# Patient Record
Sex: FEMALE | Race: BLACK OR AFRICAN AMERICAN | NOT HISPANIC OR LATINO | Employment: FULL TIME | URBAN - METROPOLITAN AREA
[De-identification: names, ages, dates, MRNs, and addresses within clinical notes are randomized per-mention and may not be internally consistent; named-entity substitution may affect disease eponyms.]

---

## 2018-06-18 ENCOUNTER — HISTORICAL (OUTPATIENT)
Dept: INTERNAL MEDICINE | Facility: CLINIC | Age: 23
End: 2018-06-18

## 2018-06-18 LAB
HAV IGM SERPL QL IA: NONREACTIVE
HBV CORE IGM SERPL QL IA: NONREACTIVE
HBV SURFACE AG SERPL QL IA: NEGATIVE
HCV AB SERPL QL IA: NONREACTIVE
HIV 1+2 AB+HIV1 P24 AG SERPL QL IA: NONREACTIVE
T PALLIDUM AB SER QL: NONREACTIVE

## 2018-10-18 LAB — POC BETA-HCG (QUAL): NEGATIVE

## 2020-10-07 LAB
BILIRUB SERPL-MCNC: NEGATIVE MG/DL
BLOOD URINE, POC: NORMAL
CLARITY, POC UA: CLEAR
COLOR, POC UA: YELLOW
GLUCOSE UR QL STRIP: NEGATIVE
KETONES UR QL STRIP: NEGATIVE
LEUKOCYTE EST, POC UA: NEGATIVE
NITRITE, POC UA: NEGATIVE
PH, POC UA: 8.5
POC BETA-HCG (QUAL): NEGATIVE
PROTEIN, POC: NEGATIVE
SPECIFIC GRAVITY, POC UA: 1.02
UROBILINOGEN, POC UA: NORMAL

## 2021-01-04 ENCOUNTER — HISTORICAL (OUTPATIENT)
Dept: LAB | Facility: HOSPITAL | Age: 26
End: 2021-01-04

## 2022-01-27 LAB
HIGH RISK HPV 16 (PRECISION): NEGATIVE
HIGH RISK HPV 18/45 (PRECISION): NEGATIVE
PAP RECOMMENDATION EXT: NORMAL
PAP SMEAR: NORMAL

## 2022-04-11 ENCOUNTER — HISTORICAL (OUTPATIENT)
Dept: ADMINISTRATIVE | Facility: HOSPITAL | Age: 27
End: 2022-04-11
Payer: MEDICAID

## 2022-04-24 VITALS
DIASTOLIC BLOOD PRESSURE: 86 MMHG | BODY MASS INDEX: 41.48 KG/M2 | WEIGHT: 242.94 LBS | OXYGEN SATURATION: 100 % | HEIGHT: 64 IN | SYSTOLIC BLOOD PRESSURE: 125 MMHG

## 2022-09-21 ENCOUNTER — HISTORICAL (OUTPATIENT)
Dept: ADMINISTRATIVE | Facility: HOSPITAL | Age: 27
End: 2022-09-21
Payer: MEDICAID

## 2022-11-14 ENCOUNTER — HOSPITAL ENCOUNTER (EMERGENCY)
Facility: HOSPITAL | Age: 27
Discharge: HOME | End: 2022-11-14
Attending: INTERNAL MEDICINE
Payer: MEDICAID

## 2022-11-14 VITALS
OXYGEN SATURATION: 100 % | DIASTOLIC BLOOD PRESSURE: 89 MMHG | TEMPERATURE: 99 F | HEART RATE: 76 BPM | WEIGHT: 240 LBS | RESPIRATION RATE: 18 BRPM | BODY MASS INDEX: 41.48 KG/M2 | SYSTOLIC BLOOD PRESSURE: 141 MMHG

## 2022-11-14 DIAGNOSIS — J06.9 VIRAL URI WITH COUGH: Primary | ICD-10-CM

## 2022-11-14 LAB
FLUAV AG UPPER RESP QL IA.RAPID: NOT DETECTED
FLUBV AG UPPER RESP QL IA.RAPID: NOT DETECTED
SARS-COV-2 RNA RESP QL NAA+PROBE: NOT DETECTED
STREP A PCR (OHS): NOT DETECTED

## 2022-11-14 PROCEDURE — 25000003 PHARM REV CODE 250: Performed by: PHYSICIAN ASSISTANT

## 2022-11-14 PROCEDURE — 99284 EMERGENCY DEPT VISIT MOD MDM: CPT | Mod: 25

## 2022-11-14 PROCEDURE — 0240U COVID/FLU A&B PCR: CPT | Performed by: PHYSICIAN ASSISTANT

## 2022-11-14 PROCEDURE — 87651 STREP A DNA AMP PROBE: CPT | Performed by: PHYSICIAN ASSISTANT

## 2022-11-14 RX ORDER — FLUTICASONE PROPIONATE 50 MCG
1 SPRAY, SUSPENSION (ML) NASAL 2 TIMES DAILY PRN
Qty: 15 G | Refills: 0 | Status: SHIPPED | OUTPATIENT
Start: 2022-11-14

## 2022-11-14 RX ORDER — CETIRIZINE HYDROCHLORIDE 10 MG/1
10 TABLET ORAL NIGHTLY
Qty: 10 TABLET | Refills: 0 | Status: SHIPPED | OUTPATIENT
Start: 2022-11-14 | End: 2022-11-24

## 2022-11-14 RX ORDER — ACETAMINOPHEN 325 MG/1
650 TABLET ORAL
Status: COMPLETED | OUTPATIENT
Start: 2022-11-14 | End: 2022-11-14

## 2022-11-14 RX ADMIN — ACETAMINOPHEN 650 MG: 325 TABLET ORAL at 12:11

## 2022-11-14 NOTE — ED PROVIDER NOTES
Encounter Date: 11/14/2022       History     Chief Complaint   Patient presents with    Sore Throat     Sinus congestion and sore throat since last night     Jayde Yost is a 27 y.o. female with no known medical problems who presents to the ED complaining of sore throat and congestion that started last night. She denies known sick contacts. She denies fevers, chills, chest pain, abdominal pain, N/V/D, dysuria.     The history is provided by the patient. No  was used.   Review of patient's allergies indicates:  No Known Allergies  No past medical history on file.  No past surgical history on file.  No family history on file.     Review of Systems   Constitutional:  Negative for chills and fever.   HENT:  Positive for congestion and sore throat.    Eyes:  Negative for redness and itching.   Respiratory:  Negative for cough and shortness of breath.    Cardiovascular:  Negative for chest pain and leg swelling.   Gastrointestinal:  Negative for abdominal pain, diarrhea and vomiting.   Genitourinary:  Negative for dysuria and frequency.   Musculoskeletal:  Negative for arthralgias and gait problem.   Skin:  Negative for rash and wound.   Neurological:  Negative for dizziness and headaches.   Psychiatric/Behavioral:  Negative for agitation and confusion.      Physical Exam     Initial Vitals [11/14/22 1108]   BP Pulse Resp Temp SpO2   (!) 141/89 76 18 98.8 °F (37.1 °C) 100 %      MAP       --         Physical Exam    Nursing note and vitals reviewed.  Constitutional: She appears well-developed and well-nourished. No distress.   HENT:   Head: Normocephalic and atraumatic.   Mouth/Throat: Posterior oropharyngeal erythema present. No oropharyngeal exudate.   Eyes: EOM are normal. No scleral icterus.   Neck: Neck supple.   Normal range of motion.  Cardiovascular:  Normal rate and regular rhythm.           No murmur heard.  Pulmonary/Chest: No respiratory distress. She has no wheezes.   Abdominal: Abdomen  is soft. She exhibits no distension. There is no abdominal tenderness.   Musculoskeletal:         General: No tenderness. Normal range of motion.      Cervical back: Normal range of motion and neck supple.     Neurological: She is alert and oriented to person, place, and time. No cranial nerve deficit.   Skin: Skin is warm and dry. Capillary refill takes less than 2 seconds. No erythema.   Psychiatric: She has a normal mood and affect. Thought content normal.       ED Course   Procedures  Labs Reviewed   COVID/FLU A&B PCR - Normal    Narrative:     The Xpert Xpress SARS-CoV-2/FLU/RSV plus is a rapid, multiplexed real-time PCR test intended for the simultaneous qualitative detection and differentiation of SARS-CoV-2, Influenza A, Influenza B, and respiratory syncytial virus (RSV) viral RNA in either nasopharyngeal swab or nasal swab specimens.         STREP GROUP A BY PCR - Normal    Narrative:     The Xpert Xpress Strep A test is a rapid, qualitative in vitro diagnostic test for the detection of Streptococcus pyogenes (Group A ß-hemolytic Streptococcus, Strep A) in throat swab specimens from patients with signs and symptoms of pharyngitis.            Imaging Results    None          Medications   acetaminophen tablet 650 mg (650 mg Oral Incomplete 11/14/22 1213)                              Clinical Impression:   Final diagnoses:  [J06.9] Viral URI with cough (Primary)      ED Disposition Condition    Discharge Stable          ED Prescriptions       Medication Sig Dispense Start Date End Date Auth. Provider    cetirizine (ZYRTEC) 10 MG tablet Take 1 tablet (10 mg total) by mouth every evening. for 10 days 10 tablet 11/14/2022 11/24/2022 Lien Mora PA-C    fluticasone propionate (FLONASE) 50 mcg/actuation nasal spray 1 spray (50 mcg total) by Each Nostril route 2 (two) times daily as needed for Rhinitis. 15 g 11/14/2022 -- Lien Mora PA-C          Follow-up Information       Follow up With  Specialties Details Why Contact Info    Melinda Nice MD Family Medicine In 3 days Hospital follow up [address] [facility][[phone]  [phone]      [facility] Emergency Medicine  If symptoms worsen [address] [facility] [id number] [phone]             Lien Mora PA-C  11/14/22 1226

## 2022-11-14 NOTE — Clinical Note
"Jayde Irvina"Priyank was seen and treated in our emergency department on 11/14/2022.  She may return to work on 11/16/2022.       If you have any questions or concerns, please don't hesitate to call.       LPN    "

## 2023-01-13 ENCOUNTER — OFFICE VISIT (OUTPATIENT)
Dept: FAMILY MEDICINE | Facility: CLINIC | Age: 28
End: 2023-01-13
Payer: MEDICAID

## 2023-01-13 ENCOUNTER — LAB VISIT (OUTPATIENT)
Dept: LAB | Facility: HOSPITAL | Age: 28
End: 2023-01-13
Attending: STUDENT IN AN ORGANIZED HEALTH CARE EDUCATION/TRAINING PROGRAM
Payer: MEDICAID

## 2023-01-13 VITALS
WEIGHT: 248 LBS | TEMPERATURE: 98 F | BODY MASS INDEX: 42.34 KG/M2 | RESPIRATION RATE: 20 BRPM | DIASTOLIC BLOOD PRESSURE: 80 MMHG | HEIGHT: 64 IN | HEART RATE: 94 BPM | SYSTOLIC BLOOD PRESSURE: 117 MMHG | OXYGEN SATURATION: 98 %

## 2023-01-13 DIAGNOSIS — Z11.3 ROUTINE SCREENING FOR STI (SEXUALLY TRANSMITTED INFECTION): ICD-10-CM

## 2023-01-13 DIAGNOSIS — D23.5: ICD-10-CM

## 2023-01-13 DIAGNOSIS — Z00.00 HEALTH MAINTENANCE EXAMINATION: ICD-10-CM

## 2023-01-13 DIAGNOSIS — L98.9 SKIN LESION: Primary | ICD-10-CM

## 2023-01-13 PROBLEM — Z72.0 TOBACCO USER: Status: ACTIVE | Noted: 2023-01-13

## 2023-01-13 PROBLEM — Z98.891 H/O CESAREAN SECTION: Status: ACTIVE | Noted: 2023-01-13

## 2023-01-13 PROBLEM — Z67.91 BLOOD TYPE, RH NEGATIVE: Status: ACTIVE | Noted: 2023-01-13

## 2023-01-13 PROBLEM — J45.909 ASTHMA: Status: ACTIVE | Noted: 2023-01-13

## 2023-01-13 LAB
APPEARANCE UR: CLEAR
BACTERIA #/AREA URNS AUTO: ABNORMAL /HPF
BILIRUB UR QL STRIP.AUTO: NEGATIVE MG/DL
C TRACH DNA SPEC QL NAA+PROBE: NOT DETECTED
COLOR UR AUTO: ABNORMAL
GLUCOSE UR QL STRIP.AUTO: NORMAL MG/DL
HBV SURFACE AG SERPL QL IA: NONREACTIVE
HCV AB SERPL QL IA: NONREACTIVE
HIV 1+2 AB+HIV1 P24 AG SERPL QL IA: NONREACTIVE
HYALINE CASTS #/AREA URNS LPF: ABNORMAL /LPF
KETONES UR QL STRIP.AUTO: NEGATIVE MG/DL
LEUKOCYTE ESTERASE UR QL STRIP.AUTO: NEGATIVE UNIT/L
N GONORRHOEA DNA SPEC QL NAA+PROBE: NOT DETECTED
NITRITE UR QL STRIP.AUTO: NEGATIVE
PH UR STRIP.AUTO: 7 [PH]
PROT UR QL STRIP.AUTO: NEGATIVE MG/DL
RBC #/AREA URNS AUTO: ABNORMAL /HPF
RBC UR QL AUTO: NEGATIVE UNIT/L
SP GR UR STRIP.AUTO: 1.02
SQUAMOUS #/AREA URNS LPF: ABNORMAL /HPF
T PALLIDUM AB SER QL: NONREACTIVE
UROBILINOGEN UR STRIP-ACNC: ABNORMAL MG/DL
WBC #/AREA URNS AUTO: ABNORMAL /HPF

## 2023-01-13 PROCEDURE — 86780 TREPONEMA PALLIDUM: CPT

## 2023-01-13 PROCEDURE — 87491 CHLMYD TRACH DNA AMP PROBE: CPT

## 2023-01-13 PROCEDURE — 87389 HIV-1 AG W/HIV-1&-2 AB AG IA: CPT

## 2023-01-13 PROCEDURE — 86803 HEPATITIS C AB TEST: CPT

## 2023-01-13 PROCEDURE — 87591 N.GONORRHOEAE DNA AMP PROB: CPT

## 2023-01-13 PROCEDURE — 36415 COLL VENOUS BLD VENIPUNCTURE: CPT

## 2023-01-13 PROCEDURE — 81001 URINALYSIS AUTO W/SCOPE: CPT

## 2023-01-13 PROCEDURE — 87340 HEPATITIS B SURFACE AG IA: CPT

## 2023-01-13 PROCEDURE — 99214 OFFICE O/P EST MOD 30 MIN: CPT | Mod: PBBFAC

## 2023-01-13 RX ORDER — MUPIROCIN 20 MG/G
OINTMENT TOPICAL 3 TIMES DAILY
Qty: 30 G | Refills: 0 | Status: SHIPPED | OUTPATIENT
Start: 2023-01-13

## 2023-01-13 RX ORDER — AMOXICILLIN AND CLAVULANATE POTASSIUM 875; 125 MG/1; MG/1
1 TABLET, FILM COATED ORAL EVERY 12 HOURS
Qty: 20 TABLET | Refills: 0 | Status: SHIPPED | OUTPATIENT
Start: 2023-01-13 | End: 2023-01-23

## 2023-01-13 NOTE — PROGRESS NOTES
"[facility] OFFICE VISIT NOTE  Jayde Yost  [id number]  2023    Chief Complaint   Patient presents with    Lesion     Patient states lesion inside belly button that bleeds has foul odor X8 months      HPI  Jayde Yost is a 27 y.o. female  presenting to [facility] for umbilical lesion that bleeds according to her menstrual cycle. First noticed about 8 months ago. Nontender except during menses. Has noticed bleeding from lesion prior to menses x3 months.   Denies abdominal surgeries that involve umbilicus. Has had two  sections. No piercing in area. No trauma.    Requesting STI screening.    Review of Systems   Gastrointestinal:  Negative for abdominal pain, constipation and diarrhea.   Genitourinary:  Negative for dysuria, frequency, hematuria, pelvic pain, urgency, vaginal bleeding, vaginal discharge and vaginal pain.   Neurological:  Negative for headaches.     Blood pressure 117/80, pulse 94, temperature 98.3 °F (36.8 °C), temperature source Oral, resp. rate 20, height 5' 3.78" (1.62 m), weight 112.5 kg (248 lb), SpO2 98 %.   Physical Exam  ABD: Single, pedunculated, mobile, firm nodule within umbilicus. Punctum expresses drops of blood with pressure and manipulation.    Vaginal Exam: No inguinal lymphadenopathy on palpation. No vulvar erythema or lesions. Vaginal mucosa without atrophy, erythema. Physiologic discharge present. Cervix well visualized without lesion or erythema. No cervical motion tenderness to palpation. No adenexal masses or tenderness to adenexal palpation.     Chaperone present for GYN exam: Yes  Name of Chaperone present: Alexa Paige LPN        Current Medications:   Current Outpatient Medications   Medication Sig Dispense Refill    amoxicillin-clavulanate 875-125mg (AUGMENTIN) 875-125 mg per tablet Take 1 tablet by mouth every 12 (twelve) hours. for 10 days 20 tablet 0    cetirizine (ZYRTEC) 10 MG tablet Take 1 tablet (10 mg total) by mouth every evening. for 10 days 10 " tablet 0    fluticasone propionate (FLONASE) 50 mcg/actuation nasal spray 1 spray (50 mcg total) by Each Nostril route 2 (two) times daily as needed for Rhinitis. 15 g 0    mupirocin (BACTROBAN) 2 % ointment Apply topically 3 (three) times daily. 30 g 0     No current facility-administered medications for this visit.       Assessment:   1. Skin lesion    2. Routine screening for STI (sexually transmitted infection)    3. Health maintenance examination        Plan:  Possibly infectious. Start Augmentin 875mg po BID x 10 days. Mupirocin @% ointment. Referral to general surgery for evaluation.  GC/CT swab, wet prep, HIV, HBV sAg, HCV Ab, RPR today. Results pending.  Urinalysis with reflex. Will call with results.    Return to clinic in 1 year or sooner if needed.       Tre Bolanos  [facility] Resident

## 2023-01-14 NOTE — PROGRESS NOTES
I reviewed History, PE, A/P and medical record.  Services provided in a teaching facility, I was immediately available.  I agree with resident, care reasonable and necessary.   I evaluated the patient with resident at time of visit, participated in key parts of H/P and management was discussed.    Smooth shiny lesion approx 4 mm ,feels like has a stalk on deep palpation, last c/s section 6 yrs ago, undersurface of lesion with foul odor on glove after exam -pt reports same order when it bleeds    Blanca Whitney MD  [facility]

## 2023-01-31 ENCOUNTER — OFFICE VISIT (OUTPATIENT)
Dept: SURGERY | Facility: CLINIC | Age: 28
End: 2023-01-31
Payer: MEDICAID

## 2023-01-31 VITALS
BODY MASS INDEX: 44.79 KG/M2 | DIASTOLIC BLOOD PRESSURE: 89 MMHG | SYSTOLIC BLOOD PRESSURE: 123 MMHG | HEIGHT: 63 IN | HEART RATE: 74 BPM | OXYGEN SATURATION: 98 % | TEMPERATURE: 98 F | WEIGHT: 252.81 LBS | RESPIRATION RATE: 18 BRPM

## 2023-01-31 DIAGNOSIS — L98.9 SKIN LESION: ICD-10-CM

## 2023-01-31 DIAGNOSIS — D23.5: ICD-10-CM

## 2023-01-31 PROCEDURE — 99215 OFFICE O/P EST HI 40 MIN: CPT | Mod: PBBFAC

## 2023-01-31 NOTE — PROGRESS NOTES
Pt seen by Dr. Brooks; US ordered & pt given centralized scheduling information sheet; Pt instructed to return to clinic in 1 month w/US; Discharge paperwork given w/pt verbalizing understanding

## 2023-01-31 NOTE — PROGRESS NOTES
[facility] General Surgery Clinic Note    CC: belly button lesion  HPI: Jayde Yost is a 27 y.o.female with an umbilical lesion that bleeds with her menstrual cycles for 8x months  She states she had two previous c-sections and denies any other abdominal surgeries  Reports one  was a low-transverse incision and the other was an emergent operation with a vertical incision up to the umbilicus  She currently does not see gyn  Last menses 23    PMH: pre-eclampsia  Current Outpatient Medications   Medication Instructions    cetirizine (ZYRTEC) 10 mg, Oral, Nightly    fluticasone propionate (FLONASE) 50 mcg, Each Nostril, 2 times daily PRN    mupirocin (BACTROBAN) 2 % ointment Topical (Top), 3 times daily     Social History     Tobacco Use    Smoking status: Every Day     Packs/day: 0.50     Types: Cigarettes    Smokeless tobacco: Never   Substance Use Topics    Alcohol use: Yes     Comment: socially    Drug use: Not Currently     Past Surgical History:   Procedure Laterality Date     SECTION  2016     SECTION       History reviewed. No pertinent family history.  Review of patient's allergies indicates:  No Known Allergies    ROS: see HPI; otherwise, ROS negative.     Vitals  Vitals:    23 0832   BP: 123/89   Pulse: 74   Resp: 18   Temp: 98.1 °F (36.7 °C)     Physical Exam  Gen: NAD  Abd: chocolate-color cyst within the umbilicus, nontender, no drainage  Gyn: *deferred*    All other results reviewed.    Assessment/Plan:  Jayde Yost is a 27 y.o.female with a chocolate-colored cyst in the umbilicus with bloody/purulent discharge with menses. Consistent with abdominal wall endometrosis  - ordered outpatient ultrasound   - r/o uterine fistula  - referral to gyn  - plan to surgically remove the lesion if gyn concurs with assessment  - RTC 1 month    Loi Kiser, MS3  [facility] [employer]  2023 8:31 AM     RESIDENT ATTESTATION:    Agree with above documentation of history and  physical exam. Changes made to body of text. In summary: 27F w/ bleeding from umbilical mass x8mo a/w menses. Concern for cutaneous endometrioma. Prior C/S x2. US to eval for uterocutaneous fistula. If negative will biopsy vs wide excision. Ob gyn referral for eval as well, pt also w/ concern for difficulty to get pregnant after 2yrs off OCPs. RTC 1 mo pending above US and obgyn referral.      Shen Brooks MD, PGY3  [facility] General Surgery

## 2023-02-03 ENCOUNTER — HOSPITAL ENCOUNTER (OUTPATIENT)
Dept: RADIOLOGY | Facility: HOSPITAL | Age: 28
Discharge: HOME | End: 2023-02-03
Attending: STUDENT IN AN ORGANIZED HEALTH CARE EDUCATION/TRAINING PROGRAM
Payer: MEDICAID

## 2023-02-03 DIAGNOSIS — D23.5: ICD-10-CM

## 2023-02-03 PROCEDURE — 76705 ECHO EXAM OF ABDOMEN: CPT | Mod: TC

## 2023-02-09 ENCOUNTER — TELEPHONE (OUTPATIENT)
Dept: GYNECOLOGY | Facility: CLINIC | Age: 28
End: 2023-02-09
Payer: MEDICAID

## 2023-02-09 NOTE — TELEPHONE ENCOUNTER
Called patient to discuss referral to GYN clinic from general surgery. No answer x 2, left VM asking patient to call clinic back.     Patient with exam findings consistent with umbilical endometrioma, as well as infertility x 2 years. Will schedule patient in preoperative clinic to discuss possible diagnostic laparoscopy, chromopertubation, and excision of endometrioma.     Message sent to schedulers for preop 3/1/23, otherwise soonest available preop visit that works for the patient.     Nikki Belcher MD  [facility] PGY3

## 2023-02-21 NOTE — PROGRESS NOTES
"[facility] Preoperative Visit History and Physical    HPI:   27 y.o.  with a history of infertility x 2 years and umbilical lesion consistent with endometrioma who presents to discuss surgical management. Bleeding from umbilicus started 8 months ago, stings, starts few days before menses and ends a few days before menses, usually lasts 4 days. Mild cramps first 1-2 days of periods.     Per previous documentation:   "Jayde Yost is a 27 y.o.female with an umbilical lesion that bleeds with her menstrual cycles for 8x months  She states she had two previous c-sections and denies any other abdominal surgeries  Reports one  was a low-transverse incision and the other was an emergent operation with a vertical incision up to the umbilicus."    Patient reports she and her fiance have been trying to conceive for 2 years, having intercourse 4x a week. She is not using any birth control. She is not using ovulation predictor kits, but states she has heard of them and knows how to use them. Her fiance has fathered a child (her second child) and has never had a semen analysis.      PMH:  Infertility  Tobacco use  Class III obesity (BMI 44)  Rh negative    ObHx:      x 1 at 35wga,  (emergent, preeclampsia), midline vertical skin incision, [facility] x 1, , Pfannenstiel, [facility]    GynHx:  Triad: 9/R/6 days  LMP: 23  Contraception: None currently. Has previously used depo provera, then Nexplanon x 3 years (was removed), OCPs x 1 year. She has been off all contraceptive methods since 2020.  STIs: remote history of trichomonas  Denies abnormal paps; last NILM in , next due      SurgHx:   x 2    FamHx:  Denies history of breast, ovarian, endometrial, colon cancers.    SocialHx:  Tobacco use: 1/2 ppd x 10 years  Denies alcohol/illicit drug use.    All:  NKDA    Meds:  None    OB History    Para Term  AB Living " "  2 2           SAB IAB Ectopic Multiple Live Births                  # Outcome Date GA Lbr Herson/2nd Weight Sex Delivery Anes PTL Lv   2 Para            1 Para              History reviewed. No pertinent past medical history.  Past Surgical History:   Procedure Laterality Date     SECTION  2016     SECTION       Prior to Admission medications    Medication Sig Start Date End Date Taking? Authorizing Provider   cetirizine (ZYRTEC) 10 MG tablet Take 1 tablet (10 mg total) by mouth every evening. for 10 days 22  Lien Mora PA-C   fluticasone propionate (FLONASE) 50 mcg/actuation nasal spray 1 spray (50 mcg total) by Each Nostril route 2 (two) times daily as needed for Rhinitis.  Patient not taking: Reported on 22   Lien Mora PA-C   mupirocin (BACTROBAN) 2 % ointment Apply topically 3 (three) times daily. 23   Tre Bolanos MD     Review of patient's allergies indicates:  No Known Allergies  Social History     Socioeconomic History    Marital status: Single   Tobacco Use    Smoking status: Every Day     Packs/day: 0.50     Types: Cigarettes    Smokeless tobacco: Never   Substance and Sexual Activity    Alcohol use: Yes     Comment: socially    Drug use: Not Currently    Sexual activity: Yes     Partners: Male     Birth control/protection: None     Family History   Problem Relation Age of Onset    No Known Problems Father     No Known Problems Mother      Review of Systems: As stated in HPI.      Objective:     Vitals:    23 1340   BP: 122/83   Pulse: 85   Resp: 16   Temp: 98.2 °F (36.8 °C)   SpO2: 97%   Weight: 116.1 kg (256 lb)   Height: 5' 6" (1.676 m)     Body mass index is 41.32 kg/m².    PHYSICAL EXAM  GEN: NAD, A&O x3  CV: RRR   LUNGS: CTABL  ABDOMEN: soft, NTND, no masses. 0.5cm deep purple nodule present in umbilicus, not currently bleeding, nontender  INCISIONS: Midline vertical skin incision and pfannenstiel incision well " healed.   VAGINA: Moist, no lesions or masses  VULVA: Normal external genitalia  CERVIX: Well visualized without any masses or lesions. No CMT. Os normal in appearance without bleeding or discharge.   UTERUS: 8-10 cm in size, mobile, not broad, smooth in contour, nontender  ADNEXA: No fullness, masses or tenderness    LABS:  Last mammogram: N/A  Last pap: NILM in , next due   EMB: N/A  Colonoscopy: N/A  Last CBC: None, ordered  Last TSH: None, ordered    IMAGING:  Abd soft tissue US 2/3/23:   FINDINGS  Exam quality: adequate for evaluation     There is an area of irregular soft tissue within the umbilicus measuring approximately 1.4 cm x 1.6 cm x 1.4 cm.  Doppler interrogation demonstrates associated scattered arterial and venous vascularity.  The surrounding periumbilical tissues are unremarkable by sonographic appearance.     Images obtained along the course of the infraumbilical midline incision scar reveal no evidence of focal subcutaneous abnormality or other findings to indicate presence of hernia.  No transient herniation of intra-abdominal contents is appreciated during Valsalva.     Limited visualization of the uterus demonstrates somewhat indistinct border at the fundus, with no other convincing focal sonographic abnormality.     IMPRESSION  1. Vascular nodularity at the umbilicus, with no sonographic features to suggest that this represents a hernia.  Characterization is otherwise limited.  2. No focal ventral abdominal wall abnormality along the course of the infraumbilical midline scar.  3. Indistinct border of the uterine fundus, but otherwise limited visualization and overall assessment.        Electronically signed by: Steffen Banuelos  Date:                                            2023  Time:                                           13:36    Assessment:      27 y.o.  with suspected endometrioma and infertility for definitive surgical management with diagnostic laparoscopy,  chromopertubation, excision of endometrioma.    1. Preoperative exam for gynecologic surgery  CBC Auto Differential    US Transvaginal Non OB      2. Endometrioma  US Transvaginal Non OB      3. Infertility, female  TSH    Antimullerian Hormone (AMH)    US Transvaginal Non OB        Plan:     Counseling: Alternatives to this planned procedure were explained to the patient including expectant, medical and other types of surgical management. She was counseled that removal of endometrioma from umbilicus may result in alteration in the appearance of her umbilicus, hernia in the future. Discussed soft tissue US does not show any evidence of fistula, but if any fistulous connection is present on laparoscopy, we will repair with oversew of the uterus. This procedure and its risks, reasons, benefits and complications (including injury to bowel, bladder, major blood vessel, ureter, bleeding, possibility of transfusion, infection, scarring, dyspareunia, further surgery, failure of the procedure or fistula formation) were reviewed in detail.    Infertility workup: Discussed ovulation predictor kit use and timed intercourse. Will obtain TSH, AMH, TVUS to complete basic infertility workup. Offered patient chromopertubation to evaluate tubal patency while we are in the OR and she does desire chromopertubation. STI screening in Jan 2023 reviewed and negative. Discussed semen analysis for her partner as a possibility in the future.   We have reviewed the typical perioperative course with hospital stay, and post-operative precautions and restrictions have been reviewed.    Patient counseled on the fact that cystotomy complicates approximately 0.3 to 11/1000 benign gynecologic surgeries, especially urogynecologic procedures and hysterectomy.  Patient is aware that, in the event of cystotomy, continuous bladder drainage will be continued for 7-10 days with Bains catheter in place.   Counseled on ureteral injury rates of 0.2-7.3%,  bowel injury rates of <1%.   Transfusion of blood and blood products discussed. Patient was consented for blood transfusion in the case of an emergency.  She understands the possibility of blood transfusion reaction and the attendant risk of transmission of HIV & Hepatitis C to be 1 in 2 million and the risk of Hepatitis B to be 1 in 200,000.  She is aware of the possibility of transfusion reaction. All questions were answered.   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  She has consented to an exam under anesthesia and understands that medical students participate in this portion of the procedure.  All questions were answered.    Preop testing ordered.  Surgery case requested. Surgical consents signed.  Instructions reviewed, including NPO after midnight.   Counseled to hold the following medications on the day of surgery: None   PAT appointment: Requested   Current contraception: None, beta hcg day of surgery    To OR for diagnostic laparoscopy, chromopertubation, excision of endometrioma with Dr. Bonilla.   Scheduled on 3/16/23    Discussed with Dr. Bonilla.    Nikki Belcher MD  [facility] PGY3

## 2023-02-22 ENCOUNTER — OFFICE VISIT (OUTPATIENT)
Dept: GYNECOLOGY | Facility: CLINIC | Age: 28
End: 2023-02-22
Payer: MEDICAID

## 2023-02-22 VITALS
WEIGHT: 256 LBS | OXYGEN SATURATION: 97 % | RESPIRATION RATE: 16 BRPM | DIASTOLIC BLOOD PRESSURE: 83 MMHG | HEART RATE: 85 BPM | TEMPERATURE: 98 F | SYSTOLIC BLOOD PRESSURE: 122 MMHG | BODY MASS INDEX: 41.14 KG/M2 | HEIGHT: 66 IN

## 2023-02-22 DIAGNOSIS — N80.129 ENDOMETRIOMA: ICD-10-CM

## 2023-02-22 DIAGNOSIS — N97.9 INFERTILITY, FEMALE: ICD-10-CM

## 2023-02-22 DIAGNOSIS — Z01.818 PREOPERATIVE EXAM FOR GYNECOLOGIC SURGERY: Primary | ICD-10-CM

## 2023-02-22 DIAGNOSIS — Z72.0 TOBACCO USE: ICD-10-CM

## 2023-02-22 PROCEDURE — 99213 OFFICE O/P EST LOW 20 MIN: CPT | Mod: PBBFAC

## 2023-02-22 NOTE — LETTER
February 22, 2023      [facility]  [address] [facility] [address]  Phone: [phone]       Patient: Jayde Yost   YOB: 1995  Date of Visit: 02/22/2023    To Whom It May Concern:    Ynes Yost  was seen at [facility] on 02/22/2023. The patient is schedule for outpatient surgery on 3/16/23. She may return to work 1-2 weeks after surgery with light duties if her symptoms allow. She needs the following restrictions: no heavy lifting for 6 weeks. If you have any questions or concerns, or if I can be of further assistance, please do not hesitate to contact me.    Sincerely,    Nikki Belcher MD

## 2023-02-23 ENCOUNTER — TELEPHONE (OUTPATIENT)
Dept: GYNECOLOGY | Facility: CLINIC | Age: 28
End: 2023-02-23
Payer: MEDICAID

## 2023-02-23 DIAGNOSIS — N80.129 ENDOMETRIOMA: ICD-10-CM

## 2023-02-23 DIAGNOSIS — N97.9 INFERTILITY, FEMALE: Primary | ICD-10-CM

## 2023-02-24 ENCOUNTER — LAB VISIT (OUTPATIENT)
Dept: LAB | Facility: HOSPITAL | Age: 28
End: 2023-02-24
Attending: STUDENT IN AN ORGANIZED HEALTH CARE EDUCATION/TRAINING PROGRAM
Payer: MEDICAID

## 2023-02-24 DIAGNOSIS — N97.9 INFERTILITY, FEMALE: ICD-10-CM

## 2023-02-24 DIAGNOSIS — Z01.818 PREOPERATIVE EXAM FOR GYNECOLOGIC SURGERY: ICD-10-CM

## 2023-02-24 LAB
BASOPHILS # BLD AUTO: 0.04 X10(3)/MCL (ref 0–0.2)
BASOPHILS NFR BLD AUTO: 0.3 %
EOSINOPHIL # BLD AUTO: 0.12 X10(3)/MCL (ref 0–0.9)
EOSINOPHIL NFR BLD AUTO: 1 %
ERYTHROCYTE [DISTWIDTH] IN BLOOD BY AUTOMATED COUNT: 13.3 % (ref 11.5–17)
HCT VFR BLD AUTO: 39.4 % (ref 37–47)
HGB BLD-MCNC: 13 G/DL (ref 12–16)
IMM GRANULOCYTES # BLD AUTO: 0.05 X10(3)/MCL (ref 0–0.04)
IMM GRANULOCYTES NFR BLD AUTO: 0.4 %
LYMPHOCYTES # BLD AUTO: 2.42 X10(3)/MCL (ref 0.6–4.6)
LYMPHOCYTES NFR BLD AUTO: 21 %
MCH RBC QN AUTO: 30 PG
MCHC RBC AUTO-ENTMCNC: 33 G/DL (ref 33–36)
MCV RBC AUTO: 91 FL (ref 80–94)
MONOCYTES # BLD AUTO: 0.75 X10(3)/MCL (ref 0.1–1.3)
MONOCYTES NFR BLD AUTO: 6.5 %
NEUTROPHILS # BLD AUTO: 8.14 X10(3)/MCL (ref 2.1–9.2)
NEUTROPHILS NFR BLD AUTO: 70.8 %
NRBC BLD AUTO-RTO: 0 %
PLATELET # BLD AUTO: 368 X10(3)/MCL (ref 130–400)
PMV BLD AUTO: 9.4 FL (ref 7.4–10.4)
RBC # BLD AUTO: 4.33 X10(6)/MCL (ref 4.2–5.4)
TSH SERPL-ACNC: 0.73 UIU/ML (ref 0.35–4.94)
WBC # SPEC AUTO: 11.5 X10(3)/MCL (ref 4.5–11.5)

## 2023-02-24 PROCEDURE — 83520 IMMUNOASSAY QUANT NOS NONAB: CPT

## 2023-02-24 PROCEDURE — 36415 COLL VENOUS BLD VENIPUNCTURE: CPT

## 2023-02-24 PROCEDURE — 85025 COMPLETE CBC W/AUTO DIFF WBC: CPT

## 2023-02-24 PROCEDURE — 84443 ASSAY THYROID STIM HORMONE: CPT

## 2023-02-25 LAB — MIS SERPL IA-MCNC: 2 NG/ML (ref 0.89–9.9)

## 2023-03-03 ENCOUNTER — HOSPITAL ENCOUNTER (OUTPATIENT)
Dept: RADIOLOGY | Facility: HOSPITAL | Age: 28
Discharge: HOME | End: 2023-03-03
Attending: STUDENT IN AN ORGANIZED HEALTH CARE EDUCATION/TRAINING PROGRAM
Payer: MEDICAID

## 2023-03-03 DIAGNOSIS — Z01.818 PREOPERATIVE EXAM FOR GYNECOLOGIC SURGERY: ICD-10-CM

## 2023-03-03 DIAGNOSIS — N80.129 ENDOMETRIOMA: ICD-10-CM

## 2023-03-03 DIAGNOSIS — N97.9 INFERTILITY, FEMALE: ICD-10-CM

## 2023-03-03 PROCEDURE — 76856 US EXAM PELVIC COMPLETE: CPT | Mod: TC

## 2023-03-09 ENCOUNTER — ANESTHESIA EVENT (OUTPATIENT)
Dept: SURGERY | Facility: HOSPITAL | Age: 28
End: 2023-03-09
Payer: MEDICAID

## 2023-03-09 NOTE — ANESTHESIA PREPROCEDURE EVALUATION
"                                                                                                             2023  Jayde Yost is a 27 y.o., female.    Pre-operative evaluation for Procedure(s) (LRB):  LAPAROSCOPY, DIAGNOSTIC (N/A)  EXCISION, ENDOMETRIOMA (N/A)  CHROMOTUBATION, OVIDUCT (Bilateral)        COVID STATUS: PFIZER X 3 (LASTLY 21)  BETA-BLOCKER: NONE    PAT NURSE PHONE INTERVIEW 3/10/23    PROBLEM LIST:  -  ENDOMETRIOSIS  -  MORBID OBESITY  -  UPT STATUS  -  Hx CHILDHOOD ASTHMA (ASYMPTOMATIC for 10+yrs.)  -  SMOKER 5 PPY  -  ETOH "OCASSIONALLY"  -  MARIJUANA USE    AM Rx DOS: FLONASE    ORDERS -   SURGEON: 20 CMP; 23 CBC  ANESTHESIA: UPT=    Patient Active Problem List   Diagnosis    H/O  section    Asthma    Blood type, Rh negative    Tobacco user       Review of patient's allergies indicates:  No Known Allergies    No current facility-administered medications on file prior to encounter.     Current Outpatient Medications on File Prior to Encounter   Medication Sig Dispense Refill    cetirizine (ZYRTEC) 10 MG tablet Take 1 tablet (10 mg total) by mouth every evening. for 10 days 10 tablet 0    fluticasone propionate (FLONASE) 50 mcg/actuation nasal spray 1 spray (50 mcg total) by Each Nostril route 2 (two) times daily as needed for Rhinitis. 15 g 0    mupirocin (BACTROBAN) 2 % ointment Apply topically 3 (three) times daily. 30 g 0       Past Surgical History:   Procedure Laterality Date     SECTION  2016     SECTION         CBC: No results for input(s): WBC, RBC, HGB, HCT, PLT, MCV, MCH, MCHC in the last 72 hours.    CMP: No results for input(s): NA, K, CL, CO2, BUN, CREATININE, GLU, MG, PHOS, CALCIUM, ALBUMIN, PROT, ALKPHOS, ALT, AST, BILITOT in the last 72 hours.    INR  No results for input(s): PT, INR, PROTIME, APTT in the last 72 hours.        Diagnostic Studies:  CtAbd Pelv w Contrast 2020:  Dependent atelectatic changes of the " lungs. Likely 4 mm nodule left Lower lobe. Heart size within normal limits. Sm Hiatal paraesophageal.   CXR :    EKG : NSR      2D Echo :  No results found for this or any previous visit.    Pre-op Assessment    I have reviewed the Patient Summary Reports.     I have reviewed the Nursing Notes. I have reviewed the NPO Status.   I have reviewed the Medications.     Review of Systems  Anesthesia Hx:  No problems with previous Anesthesia  History of prior surgery of interest to airway management or planning: Previous anesthesia: Epidural, General 1st C/S with general anesthesia.   C/S with epidural.  Denies Family Hx of Anesthesia complications.   Denies Personal Hx of Anesthesia complications.   Social:  Smoker, Social Alcohol Use 1/2PPDx 9 yrs.   Hematology/Oncology:  Hematology Normal   Oncology Normal   Hematology Comments: Blood Type  , Rh NEG    EENT/Dental:   chronic allergic rhinitis   Cardiovascular:  Cardiovascular Normal  Denies CAD.     Denies Angina.  Denies HAAS.    Pulmonary:   Asthma asymptomatic Denies Sleep Apnea. Childhood. None since 12 y/o. No inh.   Renal/:  Renal/ Normal     Hepatic/GI:  Hepatic/GI Normal  Denies GERD.    Musculoskeletal:  Musculoskeletal Normal    Neurological:  Neurology Normal Denies Seizures.    Endocrine:  Endocrine Normal  Morbid Obesity / BMI > 40  Dermatological:  Skin Normal    Psych:  Psychiatric Normal           Physical Exam  General: Cooperative, Alert and Oriented  Morbid obesity=BMI 41.32  Airway:  Mallampati: III / III  Mouth Opening: Normal  TM Distance: > 6 cm  Tongue: Normal  Neck ROM: Normal ROM  Neck: Girth Increased  Short neck. MHyoid distance <1/2 inch.  Dental:  Intact  Px denies any loose teeth.  Chest/Lungs:  Clear to auscultation, Normal Respiratory Rate    Heart:  Rate: Normal    Abdomen:  Normal, Soft        Anesthesia Plan  Type of Anesthesia, risks & benefits discussed:    Anesthesia Type: Gen ETT  Intra-op Monitoring Plan: Standard ASA  Monitors  Post Op Pain Control Plan: multimodal analgesia  Induction:  IV  Airway Plan: Direct  Informed Consent: Informed consent signed with the Patient and all parties understand the risks and agree with anesthesia plan.  All questions answered. Patient consented to blood products? Yes  ASA Score: 3  Day of Surgery Review of History & Physical: H&P Update referred to the surgeon/provider.I have interviewed and examined the patient. I have reviewed the patient's H&P dated:     Ready For Surgery From Anesthesia Perspective.     .    Lab Results   Component Value Date    WBC 11.5 02/24/2023    HGB 13.0 02/24/2023    HCT 39.4 02/24/2023    MCV 91.0 02/24/2023     02/24/2023     CMP  Sodium Level   Date Value Ref Range Status   09/27/2020 142 136 - 145 mmol/L Final     Potassium Level   Date Value Ref Range Status   09/27/2020 3.6 3.5 - 5.1 mmol/L Final     Carbon Dioxide   Date Value Ref Range Status   09/27/2020 25 21 - 32 mmol/L Final     Blood Urea Nitrogen   Date Value Ref Range Status   09/27/2020 10 7 - 18 mg/dL Final     Creatinine   Date Value Ref Range Status   09/27/2020 0.60 0.60 - 1.30 mg/dL Final     Calcium Level Total   Date Value Ref Range Status   09/27/2020 9.2 8.5 - 10.1 mg/dL Final     Albumin Level   Date Value Ref Range Status   09/27/2020 4.2 3.4 - 5.0 gm/dL Final     Bilirubin Total   Date Value Ref Range Status   09/27/2020 0.3 0.2 - 1.0 mg/dL Final     Alkaline Phosphatase   Date Value Ref Range Status   09/27/2020 59 45 - 117 unit/L Final     Aspartate Aminotransferase   Date Value Ref Range Status   09/27/2020 17 15 - 37 unit/L Final     Alanine Aminotransferase   Date Value Ref Range Status   09/27/2020 21 12 - 78 unit/L Final

## 2023-03-10 NOTE — PROGRESS NOTES
PAT call completed with patient.    Asthma  --as a child  has not been on inhalers since she was 12 years old.    Smoke--0.5 ppd x 9 years   4.5 ppys    ETOH---occasionally     Marijuana occasionally    Meds am DOS correct

## 2023-03-13 ENCOUNTER — PATIENT MESSAGE (OUTPATIENT)
Dept: ADMINISTRATIVE | Facility: OTHER | Age: 28
End: 2023-03-13
Payer: MEDICAID

## 2023-03-14 ENCOUNTER — PATIENT MESSAGE (OUTPATIENT)
Dept: ADMINISTRATIVE | Facility: OTHER | Age: 28
End: 2023-03-14
Payer: MEDICAID

## 2023-03-16 ENCOUNTER — ANESTHESIA (OUTPATIENT)
Dept: SURGERY | Facility: HOSPITAL | Age: 28
End: 2023-03-16
Payer: MEDICAID

## 2023-03-16 ENCOUNTER — PATIENT MESSAGE (OUTPATIENT)
Dept: ADMINISTRATIVE | Facility: OTHER | Age: 28
End: 2023-03-16
Payer: MEDICAID

## 2023-03-16 ENCOUNTER — HOSPITAL ENCOUNTER (OUTPATIENT)
Facility: HOSPITAL | Age: 28
Discharge: HOME | End: 2023-03-16
Attending: OBSTETRICS & GYNECOLOGY | Admitting: OBSTETRICS & GYNECOLOGY
Payer: MEDICAID

## 2023-03-16 DIAGNOSIS — N80.9 ENDOMETRIOSIS DETERMINED BY LAPAROSCOPY: ICD-10-CM

## 2023-03-16 DIAGNOSIS — N80.129 ENDOMETRIOMA: Primary | ICD-10-CM

## 2023-03-16 DIAGNOSIS — N80.00 ENDOMETRIOSIS OF UTERUS: ICD-10-CM

## 2023-03-16 DIAGNOSIS — Z01.818 PREOP EXAMINATION: ICD-10-CM

## 2023-03-16 PROBLEM — N97.9 INFERTILITY, FEMALE: Status: ACTIVE | Noted: 2023-03-16

## 2023-03-16 LAB
B-HCG FREE SERPL-ACNC: <2.42 MIU/ML
B-HCG UR QL: NEGATIVE
CTP QC/QA: YES
GROUP & RH: NORMAL
INDIRECT COOMBS GEL: NORMAL

## 2023-03-16 PROCEDURE — 88305 TISSUE EXAM BY PATHOLOGIST: CPT | Mod: TC | Performed by: OBSTETRICS & GYNECOLOGY

## 2023-03-16 PROCEDURE — 71000016 HC POSTOP RECOV ADDL HR: Performed by: OBSTETRICS & GYNECOLOGY

## 2023-03-16 PROCEDURE — 63600175 PHARM REV CODE 636 W HCPCS: Performed by: NURSE ANESTHETIST, CERTIFIED REGISTERED

## 2023-03-16 PROCEDURE — 25000003 PHARM REV CODE 250: Performed by: STUDENT IN AN ORGANIZED HEALTH CARE EDUCATION/TRAINING PROGRAM

## 2023-03-16 PROCEDURE — 25000003 PHARM REV CODE 250: Performed by: ANESTHESIOLOGY

## 2023-03-16 PROCEDURE — 84702 CHORIONIC GONADOTROPIN TEST: CPT | Performed by: STUDENT IN AN ORGANIZED HEALTH CARE EDUCATION/TRAINING PROGRAM

## 2023-03-16 PROCEDURE — 63600175 PHARM REV CODE 636 W HCPCS: Performed by: ANESTHESIOLOGY

## 2023-03-16 PROCEDURE — 81025 URINE PREGNANCY TEST: CPT | Performed by: NURSE PRACTITIONER

## 2023-03-16 PROCEDURE — 86900 BLOOD TYPING SEROLOGIC ABO: CPT | Performed by: STUDENT IN AN ORGANIZED HEALTH CARE EDUCATION/TRAINING PROGRAM

## 2023-03-16 PROCEDURE — 36000709 HC OR TIME LEV III EA ADD 15 MIN: Performed by: OBSTETRICS & GYNECOLOGY

## 2023-03-16 PROCEDURE — 00840 ANES IPER PX LOWER ABD NOS: CPT | Performed by: OBSTETRICS & GYNECOLOGY

## 2023-03-16 PROCEDURE — 71000015 HC POSTOP RECOV 1ST HR: Performed by: OBSTETRICS & GYNECOLOGY

## 2023-03-16 PROCEDURE — 63600175 PHARM REV CODE 636 W HCPCS: Mod: JZ,JG | Performed by: OBSTETRICS & GYNECOLOGY

## 2023-03-16 PROCEDURE — 93005 ELECTROCARDIOGRAM TRACING: CPT

## 2023-03-16 PROCEDURE — 36000708 HC OR TIME LEV III 1ST 15 MIN: Performed by: OBSTETRICS & GYNECOLOGY

## 2023-03-16 PROCEDURE — 63600175 PHARM REV CODE 636 W HCPCS

## 2023-03-16 PROCEDURE — 27201423 OPTIME MED/SURG SUP & DEVICES STERILE SUPPLY: Performed by: OBSTETRICS & GYNECOLOGY

## 2023-03-16 PROCEDURE — 37000009 HC ANESTHESIA EA ADD 15 MINS: Performed by: OBSTETRICS & GYNECOLOGY

## 2023-03-16 PROCEDURE — 71000033 HC RECOVERY, INTIAL HOUR: Performed by: OBSTETRICS & GYNECOLOGY

## 2023-03-16 PROCEDURE — 25000003 PHARM REV CODE 250: Performed by: NURSE ANESTHETIST, CERTIFIED REGISTERED

## 2023-03-16 PROCEDURE — 37000008 HC ANESTHESIA 1ST 15 MINUTES: Performed by: OBSTETRICS & GYNECOLOGY

## 2023-03-16 RX ORDER — LIDOCAINE HYDROCHLORIDE 10 MG/ML
INJECTION, SOLUTION EPIDURAL; INFILTRATION; INTRACAUDAL; PERINEURAL
Status: DISCONTINUED
Start: 2023-03-16 | End: 2023-03-16 | Stop reason: HOSPADM

## 2023-03-16 RX ORDER — FENTANYL CITRATE 50 UG/ML
INJECTION, SOLUTION INTRAMUSCULAR; INTRAVENOUS
Status: DISCONTINUED | OUTPATIENT
Start: 2023-03-16 | End: 2023-03-16

## 2023-03-16 RX ORDER — KETAMINE HCL IN 0.9 % NACL 50 MG/5 ML
SYRINGE (ML) INTRAVENOUS
Status: DISCONTINUED | OUTPATIENT
Start: 2023-03-16 | End: 2023-03-16

## 2023-03-16 RX ORDER — ONDANSETRON 2 MG/ML
INJECTION INTRAMUSCULAR; INTRAVENOUS
Status: DISCONTINUED | OUTPATIENT
Start: 2023-03-16 | End: 2023-03-16

## 2023-03-16 RX ORDER — SODIUM CHLORIDE 9 MG/ML
INJECTION, SOLUTION INTRAVENOUS CONTINUOUS
Status: DISCONTINUED | OUTPATIENT
Start: 2023-03-16 | End: 2023-03-16 | Stop reason: HOSPADM

## 2023-03-16 RX ORDER — LIDOCAINE HYDROCHLORIDE 10 MG/ML
1 INJECTION, SOLUTION EPIDURAL; INFILTRATION; INTRACAUDAL; PERINEURAL ONCE
Status: ACTIVE | OUTPATIENT
Start: 2023-03-16

## 2023-03-16 RX ORDER — DEXAMETHASONE SODIUM PHOSPHATE 4 MG/ML
INJECTION, SOLUTION INTRA-ARTICULAR; INTRALESIONAL; INTRAMUSCULAR; INTRAVENOUS; SOFT TISSUE
Status: DISCONTINUED | OUTPATIENT
Start: 2023-03-16 | End: 2023-03-16

## 2023-03-16 RX ORDER — HYDROMORPHONE HYDROCHLORIDE 1 MG/ML
0.4 INJECTION, SOLUTION INTRAMUSCULAR; INTRAVENOUS; SUBCUTANEOUS EVERY 10 MIN PRN
Status: ACTIVE | OUTPATIENT
Start: 2023-03-16

## 2023-03-16 RX ORDER — HYDROMORPHONE HYDROCHLORIDE 1 MG/ML
INJECTION, SOLUTION INTRAMUSCULAR; INTRAVENOUS; SUBCUTANEOUS
Status: COMPLETED
Start: 2023-03-16 | End: 2023-03-16

## 2023-03-16 RX ORDER — SODIUM CHLORIDE, SODIUM LACTATE, POTASSIUM CHLORIDE, CALCIUM CHLORIDE 600; 310; 30; 20 MG/100ML; MG/100ML; MG/100ML; MG/100ML
INJECTION, SOLUTION INTRAVENOUS CONTINUOUS
Status: ACTIVE | OUTPATIENT
Start: 2023-03-16 | End: 2023-03-16

## 2023-03-16 RX ORDER — LIDOCAINE HYDROCHLORIDE 20 MG/ML
INJECTION INTRAVENOUS
Status: DISCONTINUED | OUTPATIENT
Start: 2023-03-16 | End: 2023-03-16

## 2023-03-16 RX ORDER — ACETAMINOPHEN 500 MG
1000 TABLET ORAL ONCE
Status: COMPLETED | OUTPATIENT
Start: 2023-03-16 | End: 2023-03-16

## 2023-03-16 RX ORDER — MUPIROCIN 20 MG/G
OINTMENT TOPICAL
Status: DISCONTINUED | OUTPATIENT
Start: 2023-03-16 | End: 2023-03-16 | Stop reason: HOSPADM

## 2023-03-16 RX ORDER — METHYLENE BLUE 5 MG/ML
25 INJECTION INTRAVENOUS ONCE
Status: DISCONTINUED | OUTPATIENT
Start: 2023-03-16 | End: 2023-03-16

## 2023-03-16 RX ORDER — NEOSTIGMINE METHYLSULFATE 1 MG/ML
INJECTION, SOLUTION INTRAVENOUS
Status: DISCONTINUED | OUTPATIENT
Start: 2023-03-16 | End: 2023-03-16

## 2023-03-16 RX ORDER — FAMOTIDINE 20 MG/1
20 TABLET, FILM COATED ORAL ONCE
Status: COMPLETED | OUTPATIENT
Start: 2023-03-16 | End: 2023-03-16

## 2023-03-16 RX ORDER — IBUPROFEN 600 MG/1
600 TABLET ORAL EVERY 6 HOURS
Qty: 56 TABLET | Refills: 0 | Status: SHIPPED | OUTPATIENT
Start: 2023-03-16 | End: 2023-03-30

## 2023-03-16 RX ORDER — ACETAMINOPHEN 500 MG
1000 TABLET ORAL EVERY 8 HOURS
Qty: 30 TABLET | Refills: 0 | Status: SHIPPED | OUTPATIENT
Start: 2023-03-16

## 2023-03-16 RX ORDER — OXYCODONE HYDROCHLORIDE 5 MG/1
5 TABLET ORAL EVERY 4 HOURS PRN
Qty: 7 TABLET | Refills: 0 | Status: SHIPPED | OUTPATIENT
Start: 2023-03-16

## 2023-03-16 RX ORDER — ROCURONIUM BROMIDE 10 MG/ML
INJECTION, SOLUTION INTRAVENOUS
Status: DISCONTINUED | OUTPATIENT
Start: 2023-03-16 | End: 2023-03-16

## 2023-03-16 RX ORDER — METHYLENE BLUE 5 MG/ML
50 INJECTION INTRAVENOUS ONCE
Status: DISCONTINUED | OUTPATIENT
Start: 2023-03-16 | End: 2023-03-16 | Stop reason: HOSPADM

## 2023-03-16 RX ORDER — METOCLOPRAMIDE 10 MG/1
10 TABLET ORAL
Status: COMPLETED | OUTPATIENT
Start: 2023-03-16 | End: 2023-03-16

## 2023-03-16 RX ORDER — HYDROMORPHONE HYDROCHLORIDE 1 MG/ML
INJECTION, SOLUTION INTRAMUSCULAR; INTRAVENOUS; SUBCUTANEOUS
Status: DISCONTINUED
Start: 2023-03-16 | End: 2023-03-16 | Stop reason: HOSPADM

## 2023-03-16 RX ORDER — PROPOFOL 10 MG/ML
VIAL (ML) INTRAVENOUS
Status: DISCONTINUED | OUTPATIENT
Start: 2023-03-16 | End: 2023-03-16

## 2023-03-16 RX ORDER — MEPERIDINE HYDROCHLORIDE 25 MG/ML
12.5 INJECTION INTRAMUSCULAR; INTRAVENOUS; SUBCUTANEOUS EVERY 10 MIN PRN
Status: ACTIVE | OUTPATIENT
Start: 2023-03-16 | End: 2023-03-17

## 2023-03-16 RX ORDER — METHYLENE BLUE 5 MG/ML
INJECTION INTRAVENOUS
Status: DISCONTINUED | OUTPATIENT
Start: 2023-03-16 | End: 2023-03-16 | Stop reason: HOSPADM

## 2023-03-16 RX ORDER — KETOROLAC TROMETHAMINE 30 MG/ML
INJECTION, SOLUTION INTRAMUSCULAR; INTRAVENOUS
Status: DISCONTINUED | OUTPATIENT
Start: 2023-03-16 | End: 2023-03-16

## 2023-03-16 RX ORDER — GLYCOPYRROLATE 0.2 MG/ML
INJECTION INTRAMUSCULAR; INTRAVENOUS
Status: DISCONTINUED | OUTPATIENT
Start: 2023-03-16 | End: 2023-03-16

## 2023-03-16 RX ORDER — HYDROMORPHONE HYDROCHLORIDE 1 MG/ML
INJECTION, SOLUTION INTRAMUSCULAR; INTRAVENOUS; SUBCUTANEOUS
Status: DISCONTINUED | OUTPATIENT
Start: 2023-03-16 | End: 2023-03-16

## 2023-03-16 RX ORDER — DEXMEDETOMIDINE HYDROCHLORIDE 100 UG/ML
INJECTION, SOLUTION INTRAVENOUS
Status: DISCONTINUED | OUTPATIENT
Start: 2023-03-16 | End: 2023-03-16

## 2023-03-16 RX ORDER — ONDANSETRON 2 MG/ML
4 INJECTION INTRAMUSCULAR; INTRAVENOUS ONCE AS NEEDED
Status: ACTIVE | OUTPATIENT
Start: 2023-03-16 | End: 2034-08-12

## 2023-03-16 RX ADMIN — METOCLOPRAMIDE 10 MG: 10 TABLET ORAL at 09:03

## 2023-03-16 RX ADMIN — SODIUM CHLORIDE: 9 INJECTION, SOLUTION INTRAVENOUS at 10:03

## 2023-03-16 RX ADMIN — HYDROMORPHONE HYDROCHLORIDE 0.4 MG: 1 INJECTION, SOLUTION INTRAMUSCULAR; INTRAVENOUS; SUBCUTANEOUS at 01:03

## 2023-03-16 RX ADMIN — HYDROMORPHONE HYDROCHLORIDE 0.25 MG: 1 INJECTION, SOLUTION INTRAMUSCULAR; INTRAVENOUS; SUBCUTANEOUS at 12:03

## 2023-03-16 RX ADMIN — FENTANYL CITRATE 100 MCG: 50 INJECTION, SOLUTION INTRAMUSCULAR; INTRAVENOUS at 11:03

## 2023-03-16 RX ADMIN — DEXAMETHASONE SODIUM PHOSPHATE 8 MG: 4 INJECTION, SOLUTION INTRA-ARTICULAR; INTRALESIONAL; INTRAMUSCULAR; INTRAVENOUS; SOFT TISSUE at 11:03

## 2023-03-16 RX ADMIN — KETOROLAC TROMETHAMINE 30 MG: 30 INJECTION, SOLUTION INTRAMUSCULAR; INTRAVENOUS at 12:03

## 2023-03-16 RX ADMIN — LIDOCAINE HYDROCHLORIDE 100 MG: 20 INJECTION INTRAVENOUS at 11:03

## 2023-03-16 RX ADMIN — GLYCOPYRROLATE 0.8 MG: 0.2 INJECTION INTRAMUSCULAR; INTRAVENOUS at 12:03

## 2023-03-16 RX ADMIN — ACETAMINOPHEN 1000 MG: 500 TABLET ORAL at 09:03

## 2023-03-16 RX ADMIN — ONDANSETRON 4 MG: 2 INJECTION INTRAMUSCULAR; INTRAVENOUS at 12:03

## 2023-03-16 RX ADMIN — DEXMEDETOMIDINE 5 MCG: 200 INJECTION, SOLUTION INTRAVENOUS at 12:03

## 2023-03-16 RX ADMIN — Medication 50 MG: at 11:03

## 2023-03-16 RX ADMIN — NEOSTIGMINE METHYLSULFATE 5 MG: 1 INJECTION INTRAVENOUS at 12:03

## 2023-03-16 RX ADMIN — ROCURONIUM BROMIDE 50 MG: 10 SOLUTION INTRAVENOUS at 11:03

## 2023-03-16 RX ADMIN — DEXMEDETOMIDINE 15 MCG: 200 INJECTION, SOLUTION INTRAVENOUS at 12:03

## 2023-03-16 RX ADMIN — FAMOTIDINE 20 MG: 20 TABLET, FILM COATED ORAL at 09:03

## 2023-03-16 RX ADMIN — PROPOFOL 200 MG: 10 INJECTION, EMULSION INTRAVENOUS at 11:03

## 2023-03-16 NOTE — PROGRESS NOTES
[facility] - Periop Services  Brief Operative Note    Surgery Date: 3/16/2023     Surgeon(s) and Role:     * Carlyn Bonilla MD - Primary     * Gissell Holden MD - Resident - Assisting     * Zohra Ambrose MD - Resident - Assisting      Pre-op Diagnosis:    Endometriosis  Infertility    Post-op Diagnosis:  Post-Op Diagnosis Codes:     * Endometriosis of uterus [N80.00]  Infertility     Procedure(s) (LRB):  LAPAROSCOPY, DIAGNOSTIC (N/A)  EXCISION, ENDOMETRIOMA (N/A)  CHROMOTUBATION, OVIDUCT (Bilateral)    Anesthesia: General    Operative Findings: See Op Note    Estimated Blood Loss: 25 mL  IVF: 900 mL  UO: 150 mL         Specimens:   Specimen (24h ago, onward)       Start     Ordered    03/16/23 1226  Specimen to Pathology  RELEASE UPON ORDERING        References:    Click here for ordering Quick Tip   Question:  Release to patient  Answer:  Immediate    03/16/23 1226                  Discharge Note    OUTCOME: Patient tolerated treatment/procedure well without complication and is now ready for discharge.    DISPOSITION: Home or Self Care    FINAL DIAGNOSIS:  Endometrioma    FOLLOWUP: In clinic    DISCHARGE INSTRUCTIONS:    Discharge Procedure Orders   Diet Adult Regular     Notify your health care provider if you experience any of the following:  persistent nausea and vomiting or diarrhea     Notify your health care provider if you experience any of the following:  temperature >100.4     Notify your health care provider if you experience any of the following:  severe uncontrolled pain     Notify your health care provider if you experience any of the following:  redness, tenderness, or signs of infection (pain, swelling, redness, odor or green/yellow discharge around incision site)     Notify your health care provider if you experience any of the following:  difficulty breathing or increased cough     Notify your health care provider if you experience any of the following:  severe persistent headache     Notify  your health care provider if you experience any of the following:  persistent dizziness, light-headedness, or visual disturbances     Notify your health care provider if you experience any of the following:  increased confusion or weakness     Activity as tolerated     Zohra Ambrose MD  [facility] PGY2  03/16/2023 1:00 PM

## 2023-03-16 NOTE — H&P
"[facility] Preop H&P Update     I have seen and examined the patient on the morning of her surgery  I attest that there are no changes in her medical history since the time the H&P was performed.   She confirmed that she had no known drug allergies    BP (!) 125/2   Pulse 88   Temp 97.3 °F (36.3 °C) (Temporal)   Resp 20   Wt 115.3 kg (254 lb 3.2 oz)   LMP 2023   SpO2 100%   Breastfeeding No   BMI 41.03 kg/m²      A&O33, NAD  RRR  CTABL  Abdomen soft, non tender, non distended  Calf symmetric with no tenderness or cords     Abdominal incisions were reviewed  Denies fever/chill, nausea/vomiting, abdominal pain, chest pain, SOB.      She was able to explain the procedure in her own words.  Postoperative care was briefly re-reviewed    She desires to proceed with surgery as planned this morning.    DVT PPX: SCDs  ABX: None    TO OR for diagnostic laparoscopy, chromopertubation, excision of endometrioma    Gissell Holden MD MS  [facility]  PGY-4      [facility] Preoperative Visit History and Physical    HPI:   27 y.o.  with a history of infertility x 2 years and umbilical lesion consistent with endometrioma who presents to discuss surgical management. Bleeding from umbilicus started 8 months ago, stings, starts few days before menses and ends a few days before menses, usually lasts 4 days. Mild cramps first 1-2 days of periods.     Per previous documentation:   "Jayde Yost is a 27 y.o.female with an umbilical lesion that bleeds with her menstrual cycles for 8x months  She states she had two previous c-sections and denies any other abdominal surgeries  Reports one  was a low-transverse incision and the other was an emergent operation with a vertical incision up to the umbilicus."    Patient reports she and her fiance have been trying to conceive for 2 years, having intercourse 4x a week. She is not using any birth control. She is not using ovulation predictor kits, but states she has heard " of them and knows how to use them. Her fiance has fathered a child (her second child) and has never had a semen analysis.      PMH:  Infertility  Tobacco use  Class III obesity (BMI 44)  Rh negative    ObHx:      x 1 at 35wga,  (emergent, preeclampsia), midline vertical skin incision, [facility] , , [facility]    GynHx:  [facility]: 9/R/6 days  LMP: 23  Contraception: None currently. Has previously used depo provera, then Nexplanon x 3 years (was removed), OCPs x 1 year. She has been off all contraceptive methods since 2020.  STIs: remote history of trichomonas  Denies abnormal paps; last NILM in , next due      SurgHx:   x 2    FamHx:  Denies history of breast, ovarian, endometrial, colon cancers.    SocialHx:  Tobacco use: 1/ ppd x 10 years  Denies alcohol/illicit drug use.    All:  NKDA    Meds:  None    OB History    Para Term  AB Living   2 2           SAB IAB Ectopic Multiple Live Births                  # Outcome Date GA Lbr Herson/2nd Weight Sex Delivery Anes PTL Lv   2 Para            1 Para              History reviewed. No pertinent past medical history.  Past Surgical History:   Procedure Laterality Date     SECTION  2016     SECTION       Prior to Admission medications    Medication Sig Start Date End Date Taking? Authorizing Provider   cetirizine (ZYRTEC) 10 MG tablet Take 1 tablet (10 mg total) by mouth every evening. for 10 days 22  Lien Mora PA-C   fluticasone propionate (FLONASE) 50 mcg/actuation nasal spray 1 spray (50 mcg total) by Each Nostril route 2 (two) times daily as needed for Rhinitis.  Patient not taking: Reported on 22   Lien Mora PA-C   mupirocin (BACTROBAN) 2 % ointment Apply topically 3 (three) times daily. 23   Tre Bolanos MD     Review of patient's allergies indicates:  No Known  Allergies  Social History     Socioeconomic History    Marital status: Single   Tobacco Use    Smoking status: Every Day     Packs/day: 0.50     Years: 9.00     Pack years: 4.50     Types: Cigarettes    Smokeless tobacco: Never   Substance and Sexual Activity    Alcohol use: Yes     Comment: socially    Drug use: Not Currently    Sexual activity: Yes     Partners: Male     Birth control/protection: None     Family History   Problem Relation Age of Onset    No Known Problems Father     No Known Problems Mother      Review of Systems: As stated in HPI.      Objective:     Vitals:    03/16/23 0820 03/16/23 0916 03/16/23 0953 03/16/23 1008   BP: 122/83  122/83 (!) 125/2   Pulse: 78   88   Resp: 16   20   Temp: 98.3 °F (36.8 °C)   97.3 °F (36.3 °C)   TempSrc: Oral   Temporal   SpO2: 99%   100%   Weight:  115.3 kg (254 lb 3.2 oz)       Body mass index is 41.03 kg/m².    PHYSICAL EXAM  GEN: NAD, A&O x3  CV: RRR   LUNGS: CTABL  ABDOMEN: soft, NTND, no masses. 0.5cm deep purple nodule present in umbilicus, not currently bleeding, nontender  INCISIONS: Midline vertical skin incision and pfannenstiel incision well healed.   VAGINA: Moist, no lesions or masses  VULVA: Normal external genitalia  CERVIX: Well visualized without any masses or lesions. No CMT. Os normal in appearance without bleeding or discharge.   UTERUS: 8-10 cm in size, mobile, not broad, smooth in contour, nontender  ADNEXA: No fullness, masses or tenderness    LABS:  Last mammogram: N/A  Last pap: NILM in 2022, next due 2025  EMB: N/A  Colonoscopy: N/A  Last CBC: None, ordered  Last TSH: None, ordered    IMAGING:  Abd soft tissue US 2/3/23:   FINDINGS  Exam quality: adequate for evaluation     There is an area of irregular soft tissue within the umbilicus measuring approximately 1.4 cm x 1.6 cm x 1.4 cm.  Doppler interrogation demonstrates associated scattered arterial and venous vascularity.  The surrounding periumbilical tissues are unremarkable by  sonographic appearance.     Images obtained along the course of the infraumbilical midline incision scar reveal no evidence of focal subcutaneous abnormality or other findings to indicate presence of hernia.  No transient herniation of intra-abdominal contents is appreciated during Valsalva.     Limited visualization of the uterus demonstrates somewhat indistinct border at the fundus, with no other convincing focal sonographic abnormality.     IMPRESSION  1. Vascular nodularity at the umbilicus, with no sonographic features to suggest that this represents a hernia.  Characterization is otherwise limited.  2. No focal ventral abdominal wall abnormality along the course of the infraumbilical midline scar.  3. Indistinct border of the uterine fundus, but otherwise limited visualization and overall assessment.        Electronically signed by: Steffen Banuelos  Date:                                            2023  Time:                                           13:36    Assessment:      27 y.o.  with suspected endometrioma and infertility for definitive surgical management with diagnostic laparoscopy, chromopertubation, excision of endometrioma.    1. Preoperative exam for gynecologic surgery  CBC Auto Differential    US Transvaginal Non OB      2. Endometrioma  US Transvaginal Non OB      3. Infertility, female  TSH    Antimullerian Hormone (AMH)    US Transvaginal Non OB        Plan:     Counseling: Alternatives to this planned procedure were explained to the patient including expectant, medical and other types of surgical management. She was counseled that removal of endometrioma from umbilicus may result in alteration in the appearance of her umbilicus, hernia in the future. Discussed soft tissue US does not show any evidence of fistula, but if any fistulous connection is present on laparoscopy, we will repair with oversew of the uterus. This procedure and its risks, reasons, benefits and complications  (including injury to bowel, bladder, major blood vessel, ureter, bleeding, possibility of transfusion, infection, scarring, dyspareunia, further surgery, failure of the procedure or fistula formation) were reviewed in detail.    Infertility workup: Discussed ovulation predictor kit use and timed intercourse. Will obtain TSH, AMH, TVUS to complete basic infertility workup. Offered patient chromopertubation to evaluate tubal patency while we are in the OR and she does desire chromopertubation. STI screening in Jan 2023 reviewed and negative. Discussed semen analysis for her partner as a possibility in the future.   We have reviewed the typical perioperative course with hospital stay, and post-operative precautions and restrictions have been reviewed.    Patient counseled on the fact that cystotomy complicates approximately 0.3 to 11/1000 benign gynecologic surgeries, especially urogynecologic procedures and hysterectomy.  Patient is aware that, in the event of cystotomy, continuous bladder drainage will be continued for 7-10 days with Bains catheter in place.   Counseled on ureteral injury rates of 0.2-7.3%, bowel injury rates of <1%.   Transfusion of blood and blood products discussed. Patient was consented for blood transfusion in the case of an emergency.  She understands the possibility of blood transfusion reaction and the attendant risk of transmission of HIV & Hepatitis C to be 1 in 2 million and the risk of Hepatitis B to be 1 in 200,000.  She is aware of the possibility of transfusion reaction. All questions were answered.   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  She has consented to an exam under anesthesia and understands that medical students participate in this portion of the procedure.  All questions were answered.    Preop testing ordered.  Surgery case requested. Surgical consents signed.  Instructions  reviewed, including NPO after midnight.   Counseled to hold the following medications on the day of surgery: None   PAT appointment: Requested   Current contraception: None, beta hcg day of surgery    To OR for diagnostic laparoscopy, chromopertubation, excision of endometrioma with Dr. Bonilla.   Scheduled on 3/16/23    Discussed with Dr. Bonilla.    Nikki Belcher MD  [facility] PGY3

## 2023-03-16 NOTE — ANESTHESIA PROCEDURE NOTES
Intubation    Date/Time: 3/16/2023 11:29 AM  Performed by: Jayde Feliz CRNA  Authorized by: Jayde Feliz CRNA     Intubation:     Induction:  Intravenous    Intubated:  Postinduction    Mask Ventilation:  Easy mask    Attempts:  2    Attempted By:  Student (ELI Shi student)    Method of Intubation:  Direct    Blade:  Celestino 4    Laryngeal View Grade: Grade IV - neither epiglottis nor glottis seen      Attempted By (2nd Attempt):  BRETT (Lalo Up CRNA)    Method of Intubation (2nd Attempt):  Direct    Blade (2nd Attempt):  Celestino 4    Laryngeal View Grade (2nd Attempt): Grade IIb - only the arytenoids and epiglottis seen      Difficult Airway Encountered?: No      Airway Device:  Oral endotracheal tube    Airway Device Size:  7.5    Style/Cuff Inflation:  Cuffed (inflated to minimal occlusive pressure)    Inflation Amount (mL):  6    Tube secured:  22    Secured at:  The lips    Placement Verified By:  Capnometry    Complicating Factors:  Anterior larynx    Findings Post-Intubation:  BS equal bilateral and atraumatic/condition of teeth unchanged

## 2023-03-16 NOTE — H&P
Preop Dx: 27 F with umbilical endometriosis    Plan: LSC excision of endometriosis, excision of umbilical mass, chromotubation  She understands the possible need for umbilical hernia repair in the future and she will not have an anatomical umbilicus.    I agree with the resident's H&P.  Proceed with case.   Carlyn Bonilla MD  [facility]

## 2023-03-16 NOTE — TRANSFER OF CARE
Anesthesia Transfer of Care Note    Patient: Jayde Yost    Procedure(s) Performed: Procedure(s) (LRB):  LAPAROSCOPY, DIAGNOSTIC (N/A)  EXCISION, ENDOMETRIOMA (N/A)  CHROMOTUBATION, OVIDUCT (Bilateral)    Patient location: PACU    Anesthesia Type: general    Transport from OR: Transported from OR on room air with adequate spontaneous ventilation    Post pain: adequate analgesia    Post assessment: no apparent anesthetic complications and tolerated procedure well    Post vital signs: stable    Level of consciousness: sedated    Nausea/Vomiting: no nausea/vomiting    Complications: none    Transfer of care protocol was followed

## 2023-03-16 NOTE — DISCHARGE INSTRUCTIONS
· Keep follow up appointment in [facility] on the 7th Floor.    · No restrictions on showering or intercourse.    · Moving around carefully is recommended.    · You may shower tomorrow    · Take pain medication as prescribed. Alternate Tylenol with Ibuprofen and add oxycodone as needed for severe pain, as prescribed only.    · If you are experiencing severe pain even with your pain medications, please call the Womens clinic at [phone] to notify your doctor.    · Take over the counter laxatives such as Miralax for constipation. Do not strain to have a bowel movement.    · Brace belly with pillow when coughing/sneezing/deep breathing.    · Apply ice pack to area as needed for pain.    · No driving or consuming alcohol for the next 24 hours or while taking narcotic pain medicine.    · Some bleeding/spotting is to be expected for several days.    · Notify MD of bleeding more than 1 pad per hour, any moderate to severe pain unrelieved by pain medicine or for any signs of infection including fever above 100.4, yellow/green foul-smelling drainage, nausea or vomiting. Clinics number: [phone], or after business hours or emergency call [phone].    · Thanks for choosing [facility]! Have a smooth recovery!

## 2023-03-16 NOTE — ANESTHESIA POSTPROCEDURE EVALUATION
Anesthesia Post Evaluation    Patient: Jayde Yost    Procedure(s) Performed: Procedure(s) (LRB):  LAPAROSCOPY, DIAGNOSTIC (N/A)  EXCISION, ENDOMETRIOMA (N/A)  CHROMOTUBATION, OVIDUCT (Bilateral)    Final Anesthesia Type: general      Patient location during evaluation: PACU  Patient participation: Yes- Able to Participate  Level of consciousness: awake and alert, awake and oriented  Post-procedure vital signs: reviewed and stable  Pain management: adequate  Airway patency: patent    PONV status at discharge: No PONV  Anesthetic complications: no      Cardiovascular status: blood pressure returned to baseline, hemodynamically stable and stable  Respiratory status: unassisted, spontaneous ventilation and room air  Hydration status: euvolemic  Follow-up not needed.          Vitals Value Taken Time   /68 03/16/23 1337   Temp 36.4 °C (97.5 °F) 03/16/23 1337   Pulse 62 03/16/23 1337   Resp 18 03/16/23 1337   SpO2 99 % 03/16/23 1337         No case tracking events are documented in the log.      Pain/Jordan Score: Pain Rating Prior to Med Admin: 7 (3/16/2023  1:21 PM)  Jordan Score: 9 (3/16/2023  1:37 PM)

## 2023-03-16 NOTE — OP NOTE
"[facility]  Gynecology  Operative Note    SUMMARY     Date of Procedure: 3/16/2023     Procedure:   Diagnostic Laparoscopy  Chromopertubation  Excision of umbilical endometriosis    Surgeon(s) and Role:     * Carlyn Bonilla MD - Primary     * Gissell Holden MD - Resident - Assisting     * Zohra Ambrose MD - Resident - Attending    Pre-Operative Diagnosis:   Endometriosis  Infertility    Post-Operative Diagnosis:   Endometriosis  Infertility    Anesthesia: General    Operative Findings (including complications, if any):   EUA: Normal appearing external genitalia with lesions or abnormality. Cervix parous, and closed. Vagina pink, moist with rugae present.    Abdominal: umbilicus with several red/fleshcolored, hard masses within the umbilicus.No active bleeding noted    Laparoscopy: Normal appearing bowel, no injury abdominal cavity below trocar entry site. Normal appearing uterus and bilateral fallopian tubes. Small "cigarette burn" lesions on the right uterosacral ligament, right anterior pelvic sidewall. On Chromopertubation, expulsion of methylene blue dye noted from the right but not the left fallopian tube, suggestive of blockage of the left fallopian tube.     Description of Technical Procedures:   The patient was taken to the OR with IVF running and placed in dorsal supine position. General anesthesia was obtained, OG tube was placed and patient was placed in dorsal lithotomy using the Omar stirrups with arms tucked in  position using liberal corporal padding. SCDs were placed for DVT prophylaxis. A Bovie pad was placed on the right side. Exam under anesthesia was performed  She was prepped and draped in the usual sterile fashion. A time out was performed.    A HUMI manipulator was inserted into the uterus for manipulation and chromopertubation. Attention was then turned to the abdomen where a 7mm incision was made with an 11-blade scalpel. A 5mm 0o laparoscope was inserted into " the abdomen. Insufflation tubing was connected and gas was turned on low flow, with confirmation of entry gas was turned to high flow and the findings were as noted above. Careful inspection of the umbilicus was performed intra-abdominally. No evidence of spread of endometriosis into the abdomen and was deemed to be superficial. Attention was then paid to surveying the pelvis for gross endometriotic lesions. No large lesions were noted along the anterior abdominal wall - umbilical endometriosis noted to be superficial above the fascia.    Attention was turned back to the uterus and 50 cc of diluted Methylene blue was injected through the HUMI manipulator into the uterus and expulsion was noted from the right Fallopian tube but not the left Fallopian tube. HUMI tip balloon was deflated and dye was again injected without Methylene blue noted from the left tube.     The laparoscope was removed and the abdomen was desufflated with trocar remaining in place. The umbilicus was then inspected. Allis clamps were used to grasp the lateral aspects of the umbilicus and everted to expose the suspected endometriotic lesions. These were then grasped with a third Allis clamp. A 15-blade scalpel was used to incise the lesions circumferentially around the lesions and the pathologic tissue was grasped. Once normal subcutaneous fat was reached, the lesions were excised and sent to pathology. Tissue inside the umbilicus was palpated, fascia was noted to be intact without evidence of entry into the abdomen. Dead space was closed with 0-vicryl suture in a U-stitch and the umbilical and laparoscopic port site was closed with 4-0 monocryl in a subcuticular fashion. Incisions were reinformed with dermabond    All instruments were removed off the operative field, and counts were correct x2. Patient was cleaned taken to PACU in stable condition. Dr Bonilla was present for the entire procedure    Estimated Blood Loss: 25 mL  IVF: 900 mL  UOP:  150 mL           Implants: * No implants in log *    Specimens:   -- Umbilical mass           Condition: Good    Disposition: PACU - hemodynamically stable.    Gissell Holden MD PGY-4  Obstetrics and Gynecology

## 2023-03-16 NOTE — PLAN OF CARE
Problem: Adult Inpatient Plan of Care  Goal: Plan of Care Review  3/16/2023 1533 by Anel Guerra RN  Outcome: Met  3/16/2023 1353 by Anel Guerra RN  Outcome: Ongoing, Progressing  Goal: Patient-Specific Goal (Individualized)  3/16/2023 1533 by Anel Guerra RN  Outcome: Met  3/16/2023 1353 by Anel Guerra RN  Outcome: Ongoing, Progressing  Goal: Absence of Hospital-Acquired Illness or Injury  3/16/2023 1533 by Anel Guerra RN  Outcome: Met  3/16/2023 1353 by Anel Guerra RN  Outcome: Ongoing, Progressing  Goal: Optimal Comfort and Wellbeing  3/16/2023 1533 by Anel Guerra RN  Outcome: Met  3/16/2023 1353 by Anel Guerra RN  Outcome: Ongoing, Progressing  Goal: Readiness for Transition of Care  3/16/2023 1533 by Anel Guerra RN  Outcome: Met  3/16/2023 1353 by Anel Guerra RN  Outcome: Ongoing, Progressing     Problem: Bariatric Environmental Safety  Goal: Safety Maintained with Care  3/16/2023 1533 by Anel Guerra RN  Outcome: Met  3/16/2023 1353 by Anel Guerra RN  Outcome: Ongoing, Progressing

## 2023-03-17 VITALS
HEART RATE: 63 BPM | TEMPERATURE: 98 F | SYSTOLIC BLOOD PRESSURE: 102 MMHG | BODY MASS INDEX: 41.03 KG/M2 | RESPIRATION RATE: 18 BRPM | OXYGEN SATURATION: 100 % | WEIGHT: 254.19 LBS | DIASTOLIC BLOOD PRESSURE: 71 MMHG

## 2023-03-21 LAB
ESTROGEN SERPL-MCNC: NORMAL PG/ML
INSULIN SERPL-ACNC: NORMAL U[IU]/ML
LAB AP CLINICAL INFORMATION: NORMAL
LAB AP GROSS DESCRIPTION: NORMAL
LAB AP REPORT FOOTNOTES: NORMAL
T3RU NFR SERPL: NORMAL %

## 2023-03-27 PROBLEM — N80.9 ENDOMETRIOSIS DETERMINED BY LAPAROSCOPY: Status: ACTIVE | Noted: 2023-03-16

## 2023-03-28 ENCOUNTER — DOCUMENTATION ONLY (OUTPATIENT)
Dept: ADMINISTRATIVE | Facility: HOSPITAL | Age: 28
End: 2023-03-28
Payer: MEDICAID

## 2023-03-29 ENCOUNTER — OFFICE VISIT (OUTPATIENT)
Dept: GYNECOLOGY | Facility: CLINIC | Age: 28
End: 2023-03-29
Payer: MEDICAID

## 2023-03-29 VITALS
RESPIRATION RATE: 20 BRPM | WEIGHT: 262 LBS | DIASTOLIC BLOOD PRESSURE: 89 MMHG | BODY MASS INDEX: 42.11 KG/M2 | HEIGHT: 66 IN | TEMPERATURE: 99 F | OXYGEN SATURATION: 100 % | HEART RATE: 86 BPM | SYSTOLIC BLOOD PRESSURE: 130 MMHG

## 2023-03-29 DIAGNOSIS — Z09 POSTOP CHECK: Primary | ICD-10-CM

## 2023-03-29 DIAGNOSIS — N80.129 ENDOMETRIOMA: ICD-10-CM

## 2023-03-29 DIAGNOSIS — N97.9 INFERTILITY, FEMALE: ICD-10-CM

## 2023-03-29 PROCEDURE — 99213 OFFICE O/P EST LOW 20 MIN: CPT | Mod: PBBFAC

## 2023-03-29 NOTE — PROGRESS NOTES
"[facility] OB/GYN CLINIC NOTE  [facility]  [address] [address]  Phone: [phone]  Fax: [phone]    Postoperative Follow-Up    Subjective:      Jayde Yost is a 27 y.o.  s/p diagnostic laparoscopy, chromopertubation, excision of endometrioma 2/2 endometrioma and infertility on 3/16/2023. who presents to the clinic 2 weeks post-op.    Eating a regular diet without difficulty with normal bowel movements. Minimal lochia. Patient is not complaining of pain currently. No episodes of vaginal bleeding.    Reports the pain and bleeding from her umbilical region is completely resolved. She had one episode of menstruation since her surgery, and reports it was completely painless. She is very happy.    Does report cramping in her lower right calf from 3/24 that is now decreased in intensity. Denies swelling or calf asymmetry.     Patient's medications, allergies, past medical, surgical, social and family histories were reviewed and updated as appropriate.    Operative Findings:    EUA: Normal appearing external genitalia with lesions or abnormality. Cervix parous, and closed. Vagina pink, moist with rugae present.     Abdominal: umbilicus with several red/fleshcolored, hard masses within the umbilicus.No active bleeding noted     Laparoscopy: Normal appearing bowel, no injury abdominal cavity below trocar entry site. Normal appearing uterus and bilateral fallopian tubes. Small "cigarette burn" lesions on the right uterosacral ligament, right anterior pelvic sidewall. On Chromopertubation, expulsion of methylene blue dye noted from the right but not the left fallopian tube, suggestive of blockage of the left fallopian tube.    Pathology:  1. Abdomen, Umbilical mass:   - Endometriosis.     Review of Systems  Denies fevers, chills, headache, blurry vision, nausea, vomiting, dizziness, or syncope.   Denies chest pain, shortness of breath, RUQ pain, or calf pain.     Objective:     Vitals:    23 1041 " "  BP: 130/89   Pulse: 86   Resp: 20   Temp: 98.8 °F (37.1 °C)   SpO2: 100%   Weight: 118.8 kg (262 lb)   Height: 5' 6" (1.676 m)       Physical Exam:   General: alert and oriented, in no acute distress  Abdomen: Soft, non-distended, non tender to palpation, no involuntary guarding, no rebound tenderness. Incision in umbilicus and incision at left mid site both appear well healed, c/d/i  Extremities: Normal, atraumatic, non-edematous, mild ttp on right lower calf, not exquisite. No significant calf/ankle edema. Calves symmetric.    Postop study questions:  1. Do you have more pain in your abdomen (lower belly) today than you did yesterday? no  2. Are the drugs you are taking for pain keeping the pain under control? no  3. Do you have pain in your kidney area (i.e. around your lower back ribs)?no  4. Are you having any chest pain, upper back pain, or pain in your shoulder tips? no  5. Are you short of breath or having any difficulties breathing?no  6. Do you have a cough?no  7. Do you have any swelling in either one--or both--of your legs?no  8. Do you have a fever, chills, or night sweats?no  9. Are you lightheaded or dizzy, or have you fainted?no  10. Are you having any problems with your incision? For example, is it red, painful, or swollen? Oozing any pus? Opening up?no  11. Are you having heavy vaginal bleeding? For example, are you soaking a pad every hour?no  12. Is there excessive or foul discharge from your vagina?no  13. Have you thrown up in the last 24 h?no  14. Are you passing gas rectally? yes  15. Have you had a bowel movement in the last 24 h?yes  16. Are you having any diarrhea, or frequent, loose bowel movements?no  17. Is there urine or feces (stool) leaking from your vagina?no  18. Are you having any problems when you empty your bladder?no  19. Since your surgery, have you had any problems related to your medication?no  20. How many opioids have you taken?10  21. Day #1 meds? Ibuprofen, roxicodone " Day #2 meds?ibuprofofen, roxicodone Day#3 meds?tylenol with codeine  22. Access to reliable immediate internet?yes    Assessment:     Jayde Yost is a 27 y.o.  s/p diagnostic laparoscopy, chromopertubation, excision of endometrioma 2/2 endometrioma and infertility on 3/16/2023. Doing well postoperatively.  Operative findings again reviewed. Pathology report discussed.     Plan:     Continue any current medications.  Wound care discussed.  Activity restrictions: none  Anticipated return to work: Now  Follow up: 4 weeks for next postop.    Patient and plan were discussed with Dr. Bonilla.    Travis Hernandez MS3  Zohra Ambrose MD  [employer] [facility] PGY2  2023 4:44 PM

## 2023-03-29 NOTE — PROGRESS NOTES
"[facility] OB/GYN CLINIC NOTE  [facility]  [address] [address]  Phone: [phone]  Fax: [phone]    Postoperative Follow-Up    Subjective:      Jayde Yost is a 27 y.o.  s/p diagnostic laparoscopy, chromopertubation, excision of endometrioma 2/2 endometrioma and infertility on 3/16/2023. who presents to the clinic 2 weeks post-op.    Eating a regular diet without difficulty with normal bowel movements. Minimal lochia. Patient is not complaining of pain currently. No episodes of vaginal bleeding.    Reports the pain and bleeding from her umbilical region is completely resolved. She had one episode of menstruation since her surgery, and reports it was completely painless. She is very happy.    Does report cramping in her lower right calf from 3/24 that is now decreased in intensity. Denies swelling or calf asymmetry.     Patient's medications, allergies, past medical, surgical, social and family histories were reviewed and updated as appropriate.    Operative Findings:    EUA: Normal appearing external genitalia with lesions or abnormality. Cervix parous, and closed. Vagina pink, moist with rugae present.     Abdominal: umbilicus with several red/fleshcolored, hard masses within the umbilicus.No active bleeding noted     Laparoscopy: Normal appearing bowel, no injury abdominal cavity below trocar entry site. Normal appearing uterus and bilateral fallopian tubes. Small "cigarette burn" lesions on the right uterosacral ligament, right anterior pelvic sidewall. On Chromopertubation, expulsion of methylene blue dye noted from the right but not the left fallopian tube, suggestive of blockage of the left fallopian tube.    Pathology:  1. Abdomen, Umbilical mass:   - Endometriosis.     Review of Systems  Denies fevers, chills, headache, blurry vision, nausea, vomiting, dizziness, or syncope.   Denies chest pain, shortness of breath, RUQ pain, or calf pain.     Objective:     Vitals:    23 1041 " "  BP: 130/89   Pulse: 86   Resp: 20   Temp: 98.8 °F (37.1 °C)   SpO2: 100%   Weight: 118.8 kg (262 lb)   Height: 5' 6" (1.676 m)       Physical Exam:   General: alert and oriented, in no acute distress  Abdomen: Soft, non-distended, non tender to palpation, no involuntary guarding, no rebound tenderness. Incision in umbilicus and incision at left mid site both appear well healed, c/d/i  Extremities: Normal, atraumatic, non-edematous, mild ttp on right lower calf, not exquisite. No significant calf/ankle edema. Calves symmetric.    Postop study questions:  1. Do you have more pain in your abdomen (lower belly) today than you did yesterday? no  2. Are the drugs you are taking for pain keeping the pain under control? no  3. Do you have pain in your kidney area (i.e. around your lower back ribs)?no  4. Are you having any chest pain, upper back pain, or pain in your shoulder tips? no  5. Are you short of breath or having any difficulties breathing?no  6. Do you have a cough?no  7. Do you have any swelling in either one--or both--of your legs?no  8. Do you have a fever, chills, or night sweats?no  9. Are you lightheaded or dizzy, or have you fainted?no  10. Are you having any problems with your incision? For example, is it red, painful, or swollen? Oozing any pus? Opening up?no  11. Are you having heavy vaginal bleeding? For example, are you soaking a pad every hour?no  12. Is there excessive or foul discharge from your vagina?no  13. Have you thrown up in the last 24 h?no  14. Are you passing gas rectally? yes  15. Have you had a bowel movement in the last 24 h?yes  16. Are you having any diarrhea, or frequent, loose bowel movements?no  17. Is there urine or feces (stool) leaking from your vagina?no  18. Are you having any problems when you empty your bladder?no  19. Since your surgery, have you had any problems related to your medication?no  20. How many opioids have you taken?10  21. Day #1 meds? Ibuprofen, roxicodone " Day #2 meds?ibuprofofen, roxicodone Day#3 meds?tylenol with codeine  22. Access to reliable immediate internet?yes    Assessment:     Jayde Yost is a 27 y.o.  s/p diagnostic laparoscopy, chromopertubation, excision of endometrioma 2/2 endometrioma and infertility on 3/16/2023. Doing well postoperatively.  Operative findings again reviewed. Pathology report discussed.     Plan:     Continue any current medications.  Wound care discussed.  Activity restrictions: none  Anticipated return to work: Now  Follow up: 4 weeks for next postop.  Calf pain- low suspicion for VTE given hemodynamically stable vital signs, absence of fever, and physical exam findings of symmetric calves without erythema, cords, and firmness on R calf. Suspect musculoskeletal pain.    Patient and plan were discussed with Dr. Bonilla.    Travis Hernandez MS3  Zohra Ambrose MD  [employer] [facility] PGY2  2023 4:44 PM

## 2023-04-28 ENCOUNTER — OFFICE VISIT (OUTPATIENT)
Dept: GYNECOLOGY | Facility: CLINIC | Age: 28
End: 2023-04-28
Payer: MEDICAID

## 2023-04-28 VITALS
HEIGHT: 66 IN | TEMPERATURE: 98 F | DIASTOLIC BLOOD PRESSURE: 73 MMHG | SYSTOLIC BLOOD PRESSURE: 105 MMHG | RESPIRATION RATE: 18 BRPM | HEART RATE: 85 BPM | OXYGEN SATURATION: 100 % | BODY MASS INDEX: 41.76 KG/M2 | WEIGHT: 259.81 LBS

## 2023-04-28 DIAGNOSIS — Z09 POSTOP CHECK: ICD-10-CM

## 2023-04-28 DIAGNOSIS — Z71.3 WEIGHT LOSS COUNSELING, ENCOUNTER FOR: ICD-10-CM

## 2023-04-28 DIAGNOSIS — Z71.6 ENCOUNTER FOR SMOKING CESSATION COUNSELING: ICD-10-CM

## 2023-04-28 DIAGNOSIS — Z31.69 INFERTILITY COUNSELING: Primary | ICD-10-CM

## 2023-04-28 PROCEDURE — 99213 OFFICE O/P EST LOW 20 MIN: CPT | Mod: PBBFAC

## 2023-04-28 NOTE — PROGRESS NOTES
"  Family Medicine Clinic Note:    PCP: Melinda Nice MD    Jayde Yost is a 27 y.o. female presents to clinic today for post op 5w5d     Subjective:      Postoperative Follow-up     Jayde Yost is a 27 y.o.  who presents to the clinic 5 weeks and 5 days status post  diagnostic laparoscopy, chromopertubation (left fallopian tube blocked), excision of umbilical endometriosis for infertility and umbilical pain and bleeding . Eating a regular diet without difficulty. Bowel movements are normal. The patient is not having any pain. Menstrual cramping is better.     Patient's medications, allergies, past medical, surgical, social and family histories were reviewed and updated as appropriate.      Review of Systems  Pertinent items are noted in HPI.     PATHOLOGY:   Final Diagnosis   1. Abdomen, Umbilical mass:      - Endometriosis.         Objective:      /73   Pulse 85   Temp 98.4 °F (36.9 °C) (Oral)   Resp 18   Ht 5' 6" (1.676 m)   Wt 117.8 kg (259 lb 12.8 oz)   LMP 2023 (Exact Date)   SpO2 100%   BMI 41.93 kg/m²   General:  alert and alert,appears stated age,cooperative   Abdomen: soft, bowel sounds active, non-tender, no abnormal masses, no hepatosplenomegaly, soft,bowel sounds active,non-tender   Incision:   Incision well approximated to umbilicus and left mid abdomen healing well, no drainage, no erythema, no seroma, no dehiscence noted.         Assessment:       27 y.o.  s/p  diagnostic laparoscopy, chromopertubation (left fallopian tube blocked), excision of endometrioma 2/2 endometrioma and infertility doing well post-operatively.  Operative findings again reviewed. Pathology report discussed.  No diagnosis found.        Plan:        1. Continue any current medications.  2. Wound care discussed.  3. Activity restrictions: none  4. Anticipated return to work: now.  5. Follow up:     pt to make appt  for discussion regarding infertility.    Advised on smoking " cessation  Discussed Left fallopian tube blockage and ability to get pregnant   Advised on Weight loss measures   Instructed to start ovulation kits  Advised for  to have seman analysis    Problem List Items Addressed This Visit    None          Nila Blanco MD, PGY-2  \[facility]\""-[facility]

## 2023-04-30 PROBLEM — Z31.69 INFERTILITY COUNSELING: Status: ACTIVE | Noted: 2023-04-30

## 2023-04-30 PROBLEM — Z71.3 WEIGHT LOSS COUNSELING, ENCOUNTER FOR: Status: ACTIVE | Noted: 2023-04-30

## 2023-04-30 PROBLEM — Z71.6 ENCOUNTER FOR SMOKING CESSATION COUNSELING: Status: ACTIVE | Noted: 2023-04-30

## 2023-04-30 PROBLEM — Z09 POSTOP CHECK: Status: ACTIVE | Noted: 2023-04-30

## 2023-07-31 PROBLEM — Z09 POSTOP CHECK: Status: RESOLVED | Noted: 2023-04-30 | Resolved: 2023-07-31

## 2024-02-23 ENCOUNTER — LAB VISIT (OUTPATIENT)
Dept: LAB | Facility: HOSPITAL | Age: 29
End: 2024-02-23
Payer: MEDICAID

## 2024-02-23 ENCOUNTER — OFFICE VISIT (OUTPATIENT)
Dept: GYNECOLOGY | Facility: CLINIC | Age: 29
End: 2024-02-23
Payer: MEDICAID

## 2024-02-23 VITALS
WEIGHT: 269.38 LBS | TEMPERATURE: 99 F | OXYGEN SATURATION: 100 % | HEART RATE: 63 BPM | HEIGHT: 65 IN | RESPIRATION RATE: 18 BRPM | DIASTOLIC BLOOD PRESSURE: 68 MMHG | SYSTOLIC BLOOD PRESSURE: 104 MMHG | BODY MASS INDEX: 44.88 KG/M2

## 2024-02-23 DIAGNOSIS — Z11.3 SCREENING FOR STD (SEXUALLY TRANSMITTED DISEASE): ICD-10-CM

## 2024-02-23 DIAGNOSIS — Z72.0 TOBACCO ABUSE: ICD-10-CM

## 2024-02-23 DIAGNOSIS — Z12.4 ENCOUNTER FOR PAPANICOLAOU SMEAR FOR CERVICAL CANCER SCREENING: Primary | ICD-10-CM

## 2024-02-23 DIAGNOSIS — E66.01 CLASS 3 SEVERE OBESITY WITH BODY MASS INDEX (BMI) OF 40.0 TO 44.9 IN ADULT, UNSPECIFIED OBESITY TYPE, UNSPECIFIED WHETHER SERIOUS COMORBIDITY PRESENT: ICD-10-CM

## 2024-02-23 LAB
C TRACH DNA SPEC QL NAA+PROBE: NOT DETECTED
CLUE CELLS VAG QL WET PREP: NORMAL
HBV SURFACE AG SERPL QL IA: NONREACTIVE
HCV AB SERPL QL IA: NONREACTIVE
HIV 1+2 AB+HIV1 P24 AG SERPL QL IA: NONREACTIVE
N GONORRHOEA DNA SPEC QL NAA+PROBE: NOT DETECTED
SOURCE (OHS): NORMAL
T PALLIDUM AB SER QL: NONREACTIVE
T VAGINALIS VAG QL WET PREP: NORMAL
WBC #/AREA VAG WET PREP: NORMAL
YEAST SPEC QL WET PREP: NORMAL

## 2024-02-23 PROCEDURE — 3074F SYST BP LT 130 MM HG: CPT | Mod: CPTII,,,

## 2024-02-23 PROCEDURE — 87389 HIV-1 AG W/HIV-1&-2 AB AG IA: CPT

## 2024-02-23 PROCEDURE — 36415 COLL VENOUS BLD VENIPUNCTURE: CPT

## 2024-02-23 PROCEDURE — 3008F BODY MASS INDEX DOCD: CPT | Mod: CPTII,,,

## 2024-02-23 PROCEDURE — 87340 HEPATITIS B SURFACE AG IA: CPT

## 2024-02-23 PROCEDURE — 99213 OFFICE O/P EST LOW 20 MIN: CPT | Mod: PBBFAC

## 2024-02-23 PROCEDURE — 1159F MED LIST DOCD IN RCRD: CPT | Mod: CPTII,,,

## 2024-02-23 PROCEDURE — 86780 TREPONEMA PALLIDUM: CPT

## 2024-02-23 PROCEDURE — 87491 CHLMYD TRACH DNA AMP PROBE: CPT

## 2024-02-23 PROCEDURE — 86803 HEPATITIS C AB TEST: CPT

## 2024-02-23 PROCEDURE — 87210 SMEAR WET MOUNT SALINE/INK: CPT

## 2024-02-23 PROCEDURE — 88174 CYTOPATH C/V AUTO IN FLUID: CPT

## 2024-02-23 PROCEDURE — 99395 PREV VISIT EST AGE 18-39: CPT | Mod: S$PBB,,,

## 2024-02-23 PROCEDURE — 3078F DIAST BP <80 MM HG: CPT | Mod: CPTII,,,

## 2024-02-23 RX ORDER — AMOXICILLIN 500 MG/1
500 TABLET, FILM COATED ORAL 2 TIMES DAILY
COMMUNITY
Start: 2023-11-26

## 2024-02-23 NOTE — PROGRESS NOTES
[facility] -  Gynecology / Women's Health Clinic     Subjective:      Patient ID: Jayde Yost is a 28 y.o. female.    Chief Complaint:  Gynecologic Exam    History of Present Illness:  The patient  here for annual exam. Seen by GYN  for s/p lap chromopertubation, excision of endometrioma , and infertility counseling. Pt was set to f/u with GYN for infertility counseling 2023 but was lost to f/u. Her LMP was 24. Period last 6 days and changes tampons q2hr on heaviest 2 days due to hygiene purposes. Pt denied heavy bleeding during cycles and reports mild/mod cramping relieved with Motrin OTC. Denies history of abnormal paps. Last pap 2022-ASCUS and HPV neg.  Pap  - NIL & HPV neg. Denies breast or urinary complaints. Denies pelvic pain, abnormal bleeding or discharge. Pt c/o episodic pain at umbilical site the last 2 months during start of cycle time, would like GYN to f/u. Pt reports hx of trichomonas and desires testing. HPV vaccinated. Declines contraception, desires pregnancy. Per GYN note: pt previously used depo provera, then Nexplanon x 3 years (was removed), OCPs x 1 year. She has been off all contraceptive methods since 2020. Admits tobacco use. Dep. screening 0. Denies fly hx of ovarian, uterine or colon cancer. Maternal aunt with breast cancer.     GYN & OB History:  Patient's last menstrual period was 2024.   Date of Last Pap: 2022    OB History    Para Term  AB Living   2 2 1 1   2   SAB IAB Ectopic Multiple Live Births           2      # Outcome Date GA Lbr Herson/2nd Weight Sex Delivery Anes PTL Lv   2 Term      CS-Unspec   RUPINDER   1       CS-Unspec   RUPINDER       Past Medical History:   Diagnosis Date    Endometriosis, unspecified         Past Surgical History:   Procedure Laterality Date     SECTION  2016     SECTION  2014    CHROMOTUBATION OF FALLOPIAN TUBE Bilateral 3/16/2023    Procedure:  "CHROMOTUBATION, OVIDUCT;  Surgeon: Carlyn Bonilla MD;  Location: [facility];  Service: OB/GYN;  Laterality: Bilateral;    DIAGNOSTIC LAPAROSCOPY N/A 3/16/2023    Procedure: LAPAROSCOPY, DIAGNOSTIC;  Surgeon: Carlyn Bonilla MD;  [facility]: [facility] OR;  Service: OB/GYN;  Laterality: N/A;  methylene blue, humi manipulator.    SURGICAL REMOVAL OF ENDOMETRIOMA N/A 3/16/2023    Procedure: EXCISION, ENDOMETRIOMA;  Surgeon: Carlyn Bonilla MD;  Location: [facility];  Service: OB/GYN;  Laterality: N/A;        Social History     Tobacco Use    Smoking status: Every Day     Current packs/day: 0.50     Average packs/day: 0.5 packs/day for 9.0 years (4.5 ttl pk-yrs)     Types: Cigarettes    Smokeless tobacco: Never   Substance and Sexual Activity    Alcohol use: Yes     Comment: socially    Drug use: Yes     Types: Marijuana    Sexual activity: Yes     Partners: Male     Birth control/protection: None        Current Outpatient Medications   Medication Instructions    acetaminophen (TYLENOL) 1,000 mg, Oral, Every 8 hours    amoxicillin (AMOXIL) 500 mg, Oral, 2 times daily    cetirizine (ZYRTEC) 10 mg, Oral, Nightly    fluticasone propionate (FLONASE) 50 mcg, Each Nostril, 2 times daily PRN    mupirocin (BACTROBAN) 2 % ointment Topical (Top), 3 times daily    oxyCODONE (ROXICODONE) 5 mg, Oral, Every 4 hours PRN       Review of patient's allergies indicates:  No Known Allergies      Review of Systems:  Review of Systems  Negative except for pertinent findings for positives per HPI.     Objective:     Physical Exam   Visit Vitals  /68   Pulse 63   Temp 98.9 °F (37.2 °C) (Oral)   Resp 18   Ht 5' 5" (1.651 m)   Wt 122.2 kg (269 lb 6.4 oz)   LMP 02/01/2024   SpO2 100%   BMI 44.83 kg/m²       GENERAL: Well-developed female. No acute distress.    SKIN: Normal to inspection, warm and intact.  BREASTS: No rashes or erythema. No masses, lumps, discharge, tenderness.  VULVA: General appearance normal; external genitalia with no lesions or " erythema.  VAGINA: Mucosa/vaginal vault pink, no abnormal discharge or lesions.  CERVIX: Pink, nulliparous appearing os, no erythema or abnormal discharge.  BIMANUAL EXAM: limited exam d/t body habitus. The uterus is non tender. Bilateral adnexa reveal no tenderness.  PSYCHIATRIC: Patient is oriented to person, place, and time. Mood and affect are normal.  Note: MA chaperone present for entirety of exam.    Assessment:       ICD-10-CM ICD-9-CM   1. Encounter for Papanicolaou smear for cervical cancer screening  Z12.4 V76.2   2. Screening for STD (sexually transmitted disease)  Z11.3 V74.5   3. Class 3 severe obesity with body mass index (BMI) of 40.0 to 44.9 in adult, unspecified obesity type, unspecified whether serious comorbidity present  E66.01 278.01    Z68.41 V85.41   4. Tobacco abuse  Z72.0 305.1       Plan:     1. Encounter for Papanicolaou smear for cervical cancer screening  -     Liquid-Based Pap Smear, Screening Screening    2. Screening for STD (sexually transmitted disease)  -     Chlamydia/GC, PCR  -     Wet Prep, Genital  -     HIV 1/2 Ag/Ab (4th Gen); Future; Expected date: 02/23/2024  -     Hepatitis C Antibody; Future; Expected date: 02/23/2024  -     Hepatitis B Surface Antigen; Future; Expected date: 02/23/2024  -     SYPHILIS ANTIBODY (WITH REFLEX RPR); Future; Expected date: 02/23/2024    3. Class 3 severe obesity with body mass index (BMI) of 40.0 to 44.9 in adult, unspecified obesity type, unspecified whether serious comorbidity present    4. Tobacco abuse    Pap today  STD screen  Having umbilical pain (last 2 months at start of cycle, episodic), 2023 hx of lap chromopertubation, excision of endometrioma; infertility counseling refer to GYN residents for f/u   Healthy lifestyle choices. Importance of maintaining a healthy BMI. Healthy diet including limiting fast foods, processed foods, foods containing high amounts of saturated fats or high sodium content. Recommend low carb, low fat diet  rich in lean meat and fish, non starchy vegetables, fruits and good fat while maintaining portion control. Limit sugar intake. Recommended plenty of water, avoid carbonated beverages and high fructose corn syrup. Regular cardiovascular exercise, at least 150 minutes of cardiovascular exercise (at least 30 mins 5x/week).  Smoking cessation counseling provided. Instructed on smoking cessation program through [facility] and pharmacological interventions to aid in cessation.     Call for any GYN concerns  Follow up in about 1 year (around 2/23/2025) for Annual.

## 2024-02-28 LAB — PSYCHE PATHOLOGY RESULT: NORMAL

## 2024-03-14 ENCOUNTER — ON-DEMAND VIRTUAL (OUTPATIENT)
Dept: URGENT CARE | Facility: CLINIC | Age: 29
End: 2024-03-14
Payer: MEDICAID

## 2024-03-14 DIAGNOSIS — R05.1 ACUTE COUGH: ICD-10-CM

## 2024-03-14 DIAGNOSIS — J06.9 UPPER RESPIRATORY TRACT INFECTION, UNSPECIFIED TYPE: Primary | ICD-10-CM

## 2024-03-14 PROCEDURE — 99213 OFFICE O/P EST LOW 20 MIN: CPT | Mod: 95,,, | Performed by: FAMILY MEDICINE

## 2024-03-14 RX ORDER — BENZONATATE 100 MG/1
100 CAPSULE ORAL 3 TIMES DAILY PRN
Qty: 30 CAPSULE | Refills: 0 | Status: SHIPPED | OUTPATIENT
Start: 2024-03-14 | End: 2024-03-24

## 2024-03-14 RX ORDER — PREDNISONE 20 MG/1
20 TABLET ORAL 2 TIMES DAILY
Qty: 6 TABLET | Refills: 0 | Status: SHIPPED | OUTPATIENT
Start: 2024-03-14 | End: 2024-03-17

## 2024-03-14 NOTE — PATIENT INSTRUCTIONS
Upper respiratory infection bronchospasm, prednisone prescribed today due to history of asthma in the past, coughing medication prescribed , if symptoms getting worse in 3 to 5 days return or see  In person

## 2024-03-14 NOTE — PROGRESS NOTES
Subjective:      Patient ID: Jayde Yost is a 28 y.o. female.    Vitals:  vitals were not taken for this visit.     Chief Complaint: Nasal Congestion (2 days )      Visit Type: TELE AUDIOVISUAL    Present with the patient at the time of consultation: TELEMED PRESENT WITH PATIENT: None    Past Medical History:   Diagnosis Date    Endometriosis, unspecified      Past Surgical History:   Procedure Laterality Date     SECTION  2016     SECTION  2014    CHROMOTUBATION OF FALLOPIAN TUBE Bilateral 3/16/2023    Procedure: CHROMOTUBATION, OVIDUCT;  Surgeon: Carlyn Bonilla MD;  Location: [facility];  Service: OB/GYN;  Laterality: Bilateral;    DIAGNOSTIC LAPAROSCOPY N/A 3/16/2023    Procedure: LAPAROSCOPY, DIAGNOSTIC;  Surgeon: Carlyn Bonilla MD;  Location: [facility];  Service: OB/GYN;  Laterality: N/A;  methylene blue, humi manipulator.    SURGICAL REMOVAL OF ENDOMETRIOMA N/A 3/16/2023    Procedure: EXCISION, ENDOMETRIOMA;  Surgeon: Carlyn Bonilla MD;  Location: [facility];  Service: OB/GYN;  Laterality: N/A;     Review of patient's allergies indicates:  No Known Allergies  Current Outpatient Medications on File Prior to Visit   Medication Sig Dispense Refill    acetaminophen (TYLENOL) 500 MG tablet Take 2 tablets (1,000 mg total) by mouth every 8 (eight) hours. 30 tablet 0    amoxicillin (AMOXIL) 500 MG Tab Take 500 mg by mouth 2 (two) times daily.      cetirizine (ZYRTEC) 10 MG tablet Take 1 tablet (10 mg total) by mouth every evening. for 10 days 10 tablet 0    fluticasone propionate (FLONASE) 50 mcg/actuation nasal spray 1 spray (50 mcg total) by Each Nostril route 2 (two) times daily as needed for Rhinitis. 15 g 0    mupirocin (BACTROBAN) 2 % ointment Apply topically 3 (three) times daily. 30 g 0    oxyCODONE (ROXICODONE) 5 MG immediate release tablet Take 1 tablet (5 mg total) by mouth every 4 (four) hours as needed for Pain. 7 tablet 0     Current Facility-Administered Medications on File  Prior to Visit   Medication Dose Route Frequency Provider Last Rate Last Admin    HYDROmorphone injection 0.4 mg  0.4 mg Intravenous Q10 Min PRN Esther Pennington MD   0.4 mg at 03/16/23 1321    LIDOcaine (PF) 10 mg/ml (1%) injection 10 mg  1 mL Intradermal Once Gale Gutierrez, FNP        ondansetron injection 4 mg  4 mg Intravenous Once PRN Esther Pennington MD         Family History   Problem Relation Age of Onset    No Known Problems Father     No Known Problems Mother        Medications Ordered                [id number] - [place] - [address] AT [facility] [address]   [address], [place] [address]    Telephone: [phone]   Fax: [phone]   Hours: Not open 24 hours                         E-Prescribed (2 of 2)              benzonatate (TESSALON) 100 MG capsule    Sig: Take 1 capsule (100 mg total) by mouth 3 (three) times daily as needed for Cough.       Start: 3/14/24     Quantity: 30 capsule Refills: 0                         predniSONE (DELTASONE) 20 MG tablet    Sig: Take 1 tablet (20 mg total) by mouth 2 (two) times daily. for 3 days       Start: 3/14/24     Quantity: 6 tablet Refills: 0                           Ohs Peq Odvv Intake    3/14/2024  4:33 PM CDT - Filed by Patient   What is your current physical address in the event of a medical emergency? [address] [place] [id number]   Are you able to take your vital signs? No   Please attach any relevant images or files          Patient has no nasal congestion sinus pressure for 2 days, no fever, feeling lung  rattling, she had a history of asthma in the past, currently no wheezing, no shortness pries, no fever,        Constitution: Negative for fever.   HENT:  Positive for congestion and sinus pressure. Negative for facial swelling and sinus pain.    Respiratory:  Positive for cough. Negative for shortness of breath and wheezing.         Objective:   The physical exam was  conducted virtually.  Physical Exam   Constitutional: She is oriented to person, place, and time.   HENT:   Head: Normocephalic and atraumatic.   Eyes: Conjunctivae are normal.   Pulmonary/Chest: Effort normal. No respiratory distress.   Abdominal: Normal appearance.   Neurological: no focal deficit. She is alert and oriented to person, place, and time.   Skin: Skin is no rash.   Psychiatric: Mood, judgment and thought content normal.       Assessment:     1. Upper respiratory tract infection, unspecified type    2. Acute cough        Plan:       Upper respiratory tract infection, unspecified type  -     predniSONE (DELTASONE) 20 MG tablet; Take 1 tablet (20 mg total) by mouth 2 (two) times daily. for 3 days  Dispense: 6 tablet; Refill: 0  -     benzonatate (TESSALON) 100 MG capsule; Take 1 capsule (100 mg total) by mouth 3 (three) times daily as needed for Cough.  Dispense: 30 capsule; Refill: 0    Acute cough  -     predniSONE (DELTASONE) 20 MG tablet; Take 1 tablet (20 mg total) by mouth 2 (two) times daily. for 3 days  Dispense: 6 tablet; Refill: 0  -     benzonatate (TESSALON) 100 MG capsule; Take 1 capsule (100 mg total) by mouth 3 (three) times daily as needed for Cough.  Dispense: 30 capsule; Refill: 0      Upper respiratory infection bronchospasm, prednisone prescribed today due to history of asthma in the past, coughing medication prescribed , if symptoms getting worse in 3 to 5 days return or see  In person

## 2024-03-15 ENCOUNTER — OFFICE VISIT (OUTPATIENT)
Dept: GYNECOLOGY | Facility: CLINIC | Age: 29
End: 2024-03-15
Payer: MEDICAID

## 2024-03-15 DIAGNOSIS — Z71.6 ENCOUNTER FOR SMOKING CESSATION COUNSELING: ICD-10-CM

## 2024-03-15 DIAGNOSIS — Z31.69 INFERTILITY COUNSELING: ICD-10-CM

## 2024-03-15 DIAGNOSIS — F17.200 TOBACCO USE DISORDER: Primary | ICD-10-CM

## 2024-03-15 DIAGNOSIS — N80.129 ENDOMETRIOMA: ICD-10-CM

## 2024-03-15 PROCEDURE — 99211 OFF/OP EST MAY X REQ PHY/QHP: CPT | Mod: PBBFAC

## 2024-03-15 NOTE — PROGRESS NOTES
[facility] GYNECOLOGY CLINIC NOTE     Jayde Yost is a 28 y.o.  presenting to GYN clinic for discussion infertility. Pt is s/p resection endometrioma lesion at umbilicus and chromotubation in  3/2023. Pt states pain has been manageable since surgery. States she is having normal monthly menses. Has been tracking menses with eva and having timed intercourse based on ovulation date. Of note pt's two children are with current partner. Partner has not had semen analysis yet. States she and partner smoke cigarettes, has tried to quit cold turkey, has not tried medication or classes.     Gynecology  OB History          2    Para   2    Term   1       1    AB        Living   2         SAB        IAB        Ectopic        Multiple        Live Births   2                Past Medical History:   Diagnosis Date    Endometriosis, unspecified       Past Surgical History:   Procedure Laterality Date     SECTION  2016     SECTION      CHROMOTUBATION OF FALLOPIAN TUBE Bilateral 3/16/2023    Procedure: CHROMOTUBATION, OVIDUCT;  Surgeon: Carlyn Bonilla MD;  Location: [facility];  Service: OB/GYN;  Laterality: Bilateral;    DIAGNOSTIC LAPAROSCOPY N/A 3/16/2023    Procedure: LAPAROSCOPY, DIAGNOSTIC;  Surgeon: Carlyn Bonilla MD;  Location: [facility];  Service: OB/GYN;  Laterality: N/A;  methylene blue, humi manipulator.    SURGICAL REMOVAL OF ENDOMETRIOMA N/A 3/16/2023    Procedure: EXCISION, ENDOMETRIOMA;  Surgeon: Carlyn Bonilla MD;  Location: [facility];  Service: OB/GYN;  Laterality: N/A;      Current Outpatient Medications   Medication Instructions    acetaminophen (TYLENOL) 1,000 mg, Oral, Every 8 hours    amoxicillin (AMOXIL) 500 mg, Oral, 2 times daily    benzonatate (TESSALON) 100 mg, Oral, 3 times daily PRN    cetirizine (ZYRTEC) 10 mg, Oral, Nightly    fluticasone propionate (FLONASE) 50 mcg, Each Nostril, 2 times daily PRN    mupirocin (BACTROBAN) 2 % ointment Topical (Top), 3 times  daily    oxyCODONE (ROXICODONE) 5 mg, Oral, Every 4 hours PRN    predniSONE (DELTASONE) 20 mg, Oral, 2 times daily     Social History     Tobacco Use    Smoking status: Every Day     Current packs/day: 0.50     Average packs/day: 0.5 packs/day for 9.0 years (4.5 ttl pk-yrs)     Types: Cigarettes    Smokeless tobacco: Never   Substance Use Topics    Alcohol use: Yes     Comment: socially    Drug use: Yes     Types: Marijuana       Review of Systems  Pertinent items are noted in HPI.     Objective:     LMP 2024   Physical Exam:  Gen: Well-nourished, well-developed female appearing stated age. Alert, cooperative, in no acute distress.  CV: rr  Chest: no conversational dyspnea  Abdomen: Soft, non-tender, no masses.  Incisions: well healed umbilical incision  Extrem: Extremities normal, atraumatic, non-tender calves.    Assessment:       28 y.o.  here for infertility counseling.  1. Tobacco use disorder  Ambulatory referral/consult to Smoking Cessation Program      2. Endometrioma        3. Infertility counseling        4. Encounter for smoking cessation counseling               Plan:         Problem List Items Addressed This Visit          Renal/    Endometrioma     Pt interested in fertility, states pain is manageable with NSAIDs, does not want hormonal tx at this time. No evidence recurrence on exam. Discussed if pan worsens we can rediscuss hormonal suppression.          Infertility counseling     Discussed tubal factor with left occluded tube, possible damage to right tube given smoking hx, and ovarian factor given impacted reserve from endometriosis. Discussed smoking can also impact male factor. Informed pt she will likely need ovulation induction to achieve pregnancy, and that other options include IUI and IVF. Advised these services are provided by a fertility specialist, and most care is not covered by insurance. Encouraged to have consultation with [facility] to discuss options. Again recommended  partner get semen analysis.             Other    Encounter for smoking cessation counseling     Referral placed for tobacco cessation, encouraged partner to seek cessation resources as well.           Other Visit Diagnoses       Tobacco use disorder    -  Primary    Relevant Orders    Ambulatory referral/consult to Smoking Cessation Program            Return to clinic PRN; WWE with NP 3/2025    Discussed patient and plan with Dr. Provost Gricelda Avila MD, MPH  [employer] [facility], PGY4

## 2024-03-15 NOTE — ASSESSMENT & PLAN NOTE
Discussed tubal factor with left occluded tube, possible damage to right tube given smoking hx, and ovarian factor given impacted reserve from endometriosis. Discussed smoking can also impact male factor. Informed pt she will likely need ovulation induction to achieve pregnancy, and that other options include IUI and IVF. Advised these services are provided by a fertility specialist, and most care is not covered by insurance. Encouraged to have consultation with [facility] to discuss options. Again recommended partner get semen analysis.

## 2024-03-15 NOTE — ASSESSMENT & PLAN NOTE
Pt interested in fertility, states pain is manageable with NSAIDs, does not want hormonal tx at this time. No evidence recurrence on exam. Discussed if pan worsens we can rediscuss hormonal suppression.

## 2024-04-19 ENCOUNTER — OFFICE VISIT (OUTPATIENT)
Dept: FAMILY MEDICINE | Facility: CLINIC | Age: 29
End: 2024-04-19
Payer: MEDICAID

## 2024-04-19 VITALS
SYSTOLIC BLOOD PRESSURE: 113 MMHG | HEIGHT: 65 IN | BODY MASS INDEX: 44.82 KG/M2 | DIASTOLIC BLOOD PRESSURE: 77 MMHG | HEART RATE: 92 BPM | RESPIRATION RATE: 20 BRPM | WEIGHT: 269 LBS | TEMPERATURE: 99 F | OXYGEN SATURATION: 99 %

## 2024-04-19 DIAGNOSIS — F32.A DEPRESSION, UNSPECIFIED DEPRESSION TYPE: Primary | ICD-10-CM

## 2024-04-19 DIAGNOSIS — F41.9 ANXIETY: ICD-10-CM

## 2024-04-19 PROCEDURE — 99214 OFFICE O/P EST MOD 30 MIN: CPT | Mod: PBBFAC

## 2024-04-19 RX ORDER — SERTRALINE HYDROCHLORIDE 50 MG/1
50 TABLET, FILM COATED ORAL DAILY
Qty: 30 TABLET | Refills: 11 | Status: SHIPPED | OUTPATIENT
Start: 2024-04-19 | End: 2024-04-22

## 2024-04-19 NOTE — PROGRESS NOTES
"  [facility] FM Clinic Progress Note    ID:  Jayde Yost   MRN:  [id number]     4/19/2024      Subjective:           Patient ID: Jayde Yost is a 28 y.o. female.    Chief Complaint: Anxiety and Depression      Presents for initial evaluation of anxiety and depressed mood for about 6 months. States she gets anxiety mostly at her job but also gets it at home. Patient works as [employer] . Patient reports constant worry about losing her job and gets triggered by words such as "termination" and questioning if she is going to lose her job. At home she constantly is irritable with all the responsibilities in her family. She lives with her , two kids, and grandmother. Patient complains her  is often not helpful around the house and all the household members constantly rely on her. Patient enjoys spending time with her friends but dreadful when she has to go back home to her family. Patient reports difficulty sleeping, goes to bed at 10pm but does not fall asleep until around 1am due to tossing and turning. Patient constantly feels restless the next day. Patient does not drink coffee but does report drinking soda. No change in appetite. No loss of pleasures.  Denies any chest pains or palpitations. Denies hallucinations, SI or HI.         Review of Systems   Constitutional:  Negative for activity change and unexpected weight change.   HENT:  Negative for hearing loss, rhinorrhea and trouble swallowing.    Eyes:  Negative for discharge and visual disturbance.   Respiratory:  Negative for chest tightness and wheezing.    Cardiovascular:  Negative for chest pain.   Gastrointestinal:  Negative for blood in stool, constipation, diarrhea and vomiting.   Endocrine: Positive for polydipsia and polyuria.   Genitourinary:  Negative for difficulty urinating, dysuria, hematuria and menstrual problem.   Musculoskeletal:  Negative for arthralgias, joint swelling and neck pain.   Neurological:  " "Negative for weakness and headaches.   Psychiatric/Behavioral:  Positive for depression and dysphoric mood. Negative for confusion, hallucinations and suicidal ideas.          Objective:       Vital Signs  Temp: 99 °F (37.2 °C)  Temp Source: Oral  Pulse: 92  Resp: 20  SpO2: 99 %  BP: 113/77  BP Location: Right arm  Patient Position: Sitting  Pain Score: 0-No pain  Height and Weight  Height: 5' 5" (165.1 cm)  Weight: 122 kg (269 lb)  BSA (Calculated - sq m): 2.37 sq meters  BMI (Calculated): 44.8  Weight in (lb) to have BMI = 25: 149.9]  Physical Exam  Constitutional:       Comments: Anxiety  Pressured speech   Eyes:      Extraocular Movements: Extraocular movements intact.      Pupils: Pupils are equal, round, and reactive to light.   Cardiovascular:      Rate and Rhythm: Normal rate and regular rhythm.   Pulmonary:      Effort: Pulmonary effort is normal. No respiratory distress.      Breath sounds: Normal breath sounds.   Abdominal:      General: Abdomen is flat.      Palpations: Abdomen is soft.      Tenderness: There is no abdominal tenderness.   Neurological:      Mental Status: She is alert and oriented to person, place, and time.   Psychiatric:         Mood and Affect: Mood normal.         Behavior: Behavior normal.           Assessment & Plan:   Anxiety   Depression  - PHQ9=8; GAD7=15  - CBC, CMP, TSH, B12, Vitamin D ordered  - Start Zoloft 50 mg qd; discussed side effects with patient   - Consult with  (Samantha Up) for further resources for family therapy    Follow up in 4 weeks for reevaluation of anxiety and depression    Kamaljit Barrow MD  Family Medicine, [facility]     "

## 2024-04-19 NOTE — PROGRESS NOTES
PHQ9 = 8  XANDER 7 = 15    Answers submitted by the patient for this visit:  Review of Systems Questionnaire (Submitted on 4/19/2024)  activity change: No  unexpected weight change: No  neck pain: No  hearing loss: No  rhinorrhea: No  trouble swallowing: No  eye discharge: No  visual disturbance: No  chest tightness: No  wheezing: No  chest pain: No  palpitations: Yes  blood in stool: No  constipation: No  vomiting: No  diarrhea: No  polydipsia: Yes  polyuria: Yes  difficulty urinating: No  hematuria: No  menstrual problem: No  dysuria: No  joint swelling: No  arthralgias: No  headaches: No  weakness: No  confusion: No  dysphoric mood: Yes

## 2024-04-22 ENCOUNTER — PATIENT MESSAGE (OUTPATIENT)
Dept: FAMILY MEDICINE | Facility: CLINIC | Age: 29
End: 2024-04-22
Payer: MEDICAID

## 2024-04-22 ENCOUNTER — TELEPHONE (OUTPATIENT)
Dept: FAMILY MEDICINE | Facility: CLINIC | Age: 29
End: 2024-04-22
Payer: MEDICAID

## 2024-04-22 ENCOUNTER — LAB VISIT (OUTPATIENT)
Dept: LAB | Facility: HOSPITAL | Age: 29
End: 2024-04-22
Payer: MEDICAID

## 2024-04-22 DIAGNOSIS — F41.9 ANXIETY: ICD-10-CM

## 2024-04-22 DIAGNOSIS — F32.A DEPRESSION, UNSPECIFIED DEPRESSION TYPE: ICD-10-CM

## 2024-04-22 LAB
ALBUMIN SERPL-MCNC: 3.5 G/DL (ref 3.5–5)
ALBUMIN/GLOB SERPL: 0.9 RATIO (ref 1.1–2)
ALP SERPL-CCNC: 72 UNIT/L (ref 40–150)
ALT SERPL-CCNC: 12 UNIT/L (ref 0–55)
AST SERPL-CCNC: 13 UNIT/L (ref 5–34)
BASOPHILS # BLD AUTO: 0.05 X10(3)/MCL
BASOPHILS NFR BLD AUTO: 0.4 %
BILIRUB SERPL-MCNC: 0.2 MG/DL
BUN SERPL-MCNC: 8.7 MG/DL (ref 7–18.7)
CALCIUM SERPL-MCNC: 9 MG/DL (ref 8.4–10.2)
CHLORIDE SERPL-SCNC: 108 MMOL/L (ref 98–107)
CO2 SERPL-SCNC: 24 MMOL/L (ref 22–29)
CREAT SERPL-MCNC: 0.65 MG/DL (ref 0.55–1.02)
DEPRECATED CALCIDIOL+CALCIFEROL SERPL-MC: 8.5 NG/ML (ref 30–80)
EOSINOPHIL # BLD AUTO: 0.12 X10(3)/MCL (ref 0–0.9)
EOSINOPHIL NFR BLD AUTO: 1 %
ERYTHROCYTE [DISTWIDTH] IN BLOOD BY AUTOMATED COUNT: 13.5 % (ref 11.5–17)
GFR SERPLBLD CREATININE-BSD FMLA CKD-EPI: >60 MLS/MIN/1.73/M2
GLOBULIN SER-MCNC: 4 GM/DL (ref 2.4–3.5)
GLUCOSE SERPL-MCNC: 95 MG/DL (ref 74–100)
HCT VFR BLD AUTO: 39.8 % (ref 37–47)
HGB BLD-MCNC: 13.3 G/DL (ref 12–16)
IMM GRANULOCYTES # BLD AUTO: 0.05 X10(3)/MCL (ref 0–0.04)
IMM GRANULOCYTES NFR BLD AUTO: 0.4 %
LYMPHOCYTES # BLD AUTO: 2.98 X10(3)/MCL (ref 0.6–4.6)
LYMPHOCYTES NFR BLD AUTO: 23.8 %
MCH RBC QN AUTO: 29.9 PG (ref 27–31)
MCHC RBC AUTO-ENTMCNC: 33.4 G/DL (ref 33–36)
MCV RBC AUTO: 89.4 FL (ref 80–94)
MONOCYTES # BLD AUTO: 0.77 X10(3)/MCL (ref 0.1–1.3)
MONOCYTES NFR BLD AUTO: 6.1 %
NEUTROPHILS # BLD AUTO: 8.56 X10(3)/MCL (ref 2.1–9.2)
NEUTROPHILS NFR BLD AUTO: 68.3 %
NRBC BLD AUTO-RTO: 0 %
PLATELET # BLD AUTO: 417 X10(3)/MCL (ref 130–400)
PMV BLD AUTO: 9.1 FL (ref 7.4–10.4)
POTASSIUM SERPL-SCNC: 3.8 MMOL/L (ref 3.5–5.1)
PROT SERPL-MCNC: 7.5 GM/DL (ref 6.4–8.3)
RBC # BLD AUTO: 4.45 X10(6)/MCL (ref 4.2–5.4)
SODIUM SERPL-SCNC: 138 MMOL/L (ref 136–145)
TSH SERPL-ACNC: 2.14 UIU/ML (ref 0.35–4.94)
VIT B12 SERPL-MCNC: 498 PG/ML (ref 213–816)
WBC # SPEC AUTO: 12.53 X10(3)/MCL (ref 4.5–11.5)

## 2024-04-22 PROCEDURE — 85025 COMPLETE CBC W/AUTO DIFF WBC: CPT

## 2024-04-22 PROCEDURE — 36415 COLL VENOUS BLD VENIPUNCTURE: CPT

## 2024-04-22 PROCEDURE — 80053 COMPREHEN METABOLIC PANEL: CPT

## 2024-04-22 PROCEDURE — 82306 VITAMIN D 25 HYDROXY: CPT

## 2024-04-22 PROCEDURE — 84443 ASSAY THYROID STIM HORMONE: CPT

## 2024-04-22 PROCEDURE — 82607 VITAMIN B-12: CPT

## 2024-04-22 RX ORDER — SERTRALINE HYDROCHLORIDE 50 MG/1
50 TABLET, FILM COATED ORAL DAILY
Qty: 30 TABLET | Refills: 11 | Status: SHIPPED | OUTPATIENT
Start: 2024-04-22 | End: 2025-04-22

## 2024-04-22 NOTE — TELEPHONE ENCOUNTER
4/22/24    LCSW received a referral from Dr. Barrow requesting LCSW assistance in further assessment and resource navigation for depression/anxiety. Patient reports anxiety/depression for approximately 6 months and feels she would benefit from family counseling.     LCSW attempted to contact patient to assess for concerns and discuss resources. There was no answer. LCSW was able to leave a VM and will make another attempt to reach patient at a later date.     ---------------------------------------  4/25/24    LCSW attempted to contact patient to assess for concerns and discuss resources. There was no answer. LCSW was able to leave a VM and will make one additional attempt to reach patient at a later date.   ---------------------------------------  4/29/24    LCSW attempted to contact patient to assess for concerns and discuss resources. There was no answer. LCSW was able to leave a VM. LCSW will be available to assist patient if she returns the call or if she returns to clinic.

## 2024-04-26 ENCOUNTER — PATIENT MESSAGE (OUTPATIENT)
Dept: FAMILY MEDICINE | Facility: CLINIC | Age: 29
End: 2024-04-26
Payer: MEDICAID

## 2024-04-26 DIAGNOSIS — E55.9 VITAMIN D DEFICIENCY: Primary | ICD-10-CM

## 2024-04-26 RX ORDER — ERGOCALCIFEROL 1.25 MG/1
50000 CAPSULE ORAL
Qty: 12 CAPSULE | Refills: 0 | Status: SHIPPED | OUTPATIENT
Start: 2024-04-26

## 2024-04-26 NOTE — PROGRESS NOTES
Messaged patient of vitamin D levels. Will be prescribing 50,000U q weekly x 12 weeks.     Repeat Vit D in 3 months. Consider further workup if indicated (PTH, phosphate)    Consider repeat CBC and UA due to elevated WBC at last clinic visit.      Kamaljit Barrow MD  Family Medicine, [facility]

## 2024-05-21 NOTE — PROGRESS NOTES
Date of Service: 4/19/2024    Attending Attestation: Patient discussed with resident. The chart was reviewed thoroughly including pertinent vitals, labs, imaging, medications, prior notes, and consultant/specialist recommendations.  I participated in the management of the patient, examined the patient, reviewed the summary of the plan, and was immediately available at all times throughout the encounter. Services were furnished in a primary care center located in the outpatient department of a [facility] hospital. I agree with the resident's findings and plan as documented in the resident's note.    Carmen Hylton MD  Attending - Family Medicine / Geriatric Medicine  [facility] [place], [facility]

## 2025-02-26 ENCOUNTER — OFFICE VISIT (OUTPATIENT)
Dept: GYNECOLOGY | Facility: CLINIC | Age: 30
End: 2025-02-26
Payer: COMMERCIAL

## 2025-02-26 ENCOUNTER — LAB VISIT (OUTPATIENT)
Dept: LAB | Facility: HOSPITAL | Age: 30
End: 2025-02-26
Payer: COMMERCIAL

## 2025-02-26 VITALS
DIASTOLIC BLOOD PRESSURE: 78 MMHG | RESPIRATION RATE: 18 BRPM | SYSTOLIC BLOOD PRESSURE: 114 MMHG | HEART RATE: 93 BPM | BODY MASS INDEX: 45.92 KG/M2 | WEIGHT: 275.63 LBS | HEIGHT: 65 IN | TEMPERATURE: 99 F | OXYGEN SATURATION: 100 %

## 2025-02-26 DIAGNOSIS — Z11.3 SCREENING FOR STD (SEXUALLY TRANSMITTED DISEASE): ICD-10-CM

## 2025-02-26 DIAGNOSIS — Z76.89 ESTABLISHING CARE WITH NEW DOCTOR, ENCOUNTER FOR: ICD-10-CM

## 2025-02-26 DIAGNOSIS — Z01.419 WELL WOMAN EXAM WITH ROUTINE GYNECOLOGICAL EXAM: Primary | ICD-10-CM

## 2025-02-26 LAB
B-HCG UR QL: NEGATIVE
C TRACH DNA SPEC QL NAA+PROBE: NOT DETECTED
CLUE CELLS VAG QL WET PREP: ABNORMAL
CTP QC/QA: YES
HBV SURFACE AG SERPL QL IA: NONREACTIVE
HCV AB SERPL QL IA: NONREACTIVE
HIV 1+2 AB+HIV1 P24 AG SERPL QL IA: NONREACTIVE
N GONORRHOEA DNA SPEC QL NAA+PROBE: NOT DETECTED
SOURCE (OHS): NORMAL
T PALLIDUM AB SER QL: NONREACTIVE
T VAGINALIS VAG QL WET PREP: ABNORMAL
WBC #/AREA VAG WET PREP: ABNORMAL
YEAST SPEC QL WET PREP: ABNORMAL

## 2025-02-26 PROCEDURE — 1159F MED LIST DOCD IN RCRD: CPT | Mod: CPTII,,,

## 2025-02-26 PROCEDURE — 87340 HEPATITIS B SURFACE AG IA: CPT

## 2025-02-26 PROCEDURE — 86780 TREPONEMA PALLIDUM: CPT

## 2025-02-26 PROCEDURE — 81025 URINE PREGNANCY TEST: CPT | Mod: PBBFAC

## 2025-02-26 PROCEDURE — 87389 HIV-1 AG W/HIV-1&-2 AB AG IA: CPT

## 2025-02-26 PROCEDURE — 86803 HEPATITIS C AB TEST: CPT

## 2025-02-26 PROCEDURE — 99395 PREV VISIT EST AGE 18-39: CPT | Mod: S$PBB,,,

## 2025-02-26 PROCEDURE — 36415 COLL VENOUS BLD VENIPUNCTURE: CPT

## 2025-02-26 PROCEDURE — 3044F HG A1C LEVEL LT 7.0%: CPT | Mod: CPTII,,,

## 2025-02-26 PROCEDURE — 87591 N.GONORRHOEAE DNA AMP PROB: CPT

## 2025-02-26 PROCEDURE — 3074F SYST BP LT 130 MM HG: CPT | Mod: CPTII,,,

## 2025-02-26 PROCEDURE — 3008F BODY MASS INDEX DOCD: CPT | Mod: CPTII,,,

## 2025-02-26 PROCEDURE — 87210 SMEAR WET MOUNT SALINE/INK: CPT

## 2025-02-26 PROCEDURE — 99215 OFFICE O/P EST HI 40 MIN: CPT | Mod: PBBFAC

## 2025-02-26 PROCEDURE — 3078F DIAST BP <80 MM HG: CPT | Mod: CPTII,,,

## 2025-02-26 NOTE — PROGRESS NOTES
[facility] -  Gynecology / Women's Health Clinic     Subjective:      Patient ID: Jayde Yost is a 29 y.o. female.    Chief Complaint:  Well Woman      History of Present Illness:  The patient  here for annual exam. Seen by GYN  for s/p lap chromopertubation, excision of endometrioma , and infertility counseling. Last seen GYN for fertility counseling . Her LMP was 25. Period last 6 days and changes tampons 3-4x/day on heaviest 2 days due to hygiene purposes. Pt denied heavy bleeding during cycles and reports mild/mod cramping relieved with Motrin OTC. Denies history of abnormal paps. Last pap 24 - NIL. Denies breast or urinary complaints. Denies pelvic pain, abnormal bleeding or discharge. Pt reports hx of trichomonas and desires testing. HPV vaccinated. Declines contraception, desires pregnancy. Per GYN note: pt previously used depo provera, then Nexplanon x 3 years (was removed), OCPs x 1 year. She has been off all contraceptive methods since 2020. Admits tobacco use. Dep. screening 0. Denies fly hx of ovarian, uterine or colon cancer. Maternal aunt with breast cancer.     GYN & OB History:  Patient's last menstrual period was 2025.   Date of Last Pap: 2024    OB History    Para Term  AB Living   2 2 1 1  2   SAB IAB Ectopic Multiple Live Births       2      # Outcome Date GA Lbr Herson/2nd Weight Sex Type Anes PTL Lv   2 Term      CS-Unspec   RUPINDER   1       CS-Unspec   RUPINDER       Past Medical History:   Diagnosis Date    Endometriosis, unspecified         Past Surgical History:   Procedure Laterality Date     SECTION  2016     SECTION  2014    CHROMOTUBATION OF FALLOPIAN TUBE Bilateral 3/16/2023    Procedure: CHROMOTUBATION, OVIDUCT;  Surgeon: Carlyn Bonilla MD;  Location: [facility];  Service: OB/GYN;  Laterality: Bilateral;    DIAGNOSTIC LAPAROSCOPY N/A 3/16/2023    Procedure: LAPAROSCOPY, DIAGNOSTIC;   "Surgeon: Carlyn Bonilla MD;  Location: [facility];  Service: OB/GYN;  Laterality: N/A;  methylene blue, humi manipulator.    SURGICAL REMOVAL OF ENDOMETRIOMA N/A 3/16/2023    Procedure: EXCISION, ENDOMETRIOMA;  Surgeon: Carlyn Bonilla MD;  Location: [facility];  Service: OB/GYN;  Laterality: N/A;        Social History     Tobacco Use    Smoking status: Every Day     Current packs/day: 0.50     Average packs/day: 0.5 packs/day for 9.0 years (4.5 ttl pk-yrs)     Types: Cigarettes    Smokeless tobacco: Never   Substance and Sexual Activity    Alcohol use: Yes     Alcohol/week: 2.0 standard drinks of alcohol     Types: 1 Glasses of wine, 1 Shots of liquor per week     Comment: socially    Drug use: Yes     Types: Marijuana    Sexual activity: Yes     Partners: Male     Birth control/protection: None        Current Outpatient Medications   Medication Instructions    acetaminophen (TYLENOL) 1,000 mg, Oral, Every 8 hours    amoxicillin (AMOXIL) 500 mg, Oral, 2 times daily    cetirizine (ZYRTEC) 10 mg, Oral, Nightly    ergocalciferol (ERGOCALCIFEROL) 50,000 Units, Oral, Every 7 days    fluticasone propionate (FLONASE) 50 mcg, Each Nostril, 2 times daily PRN    mupirocin (BACTROBAN) 2 % ointment Topical (Top), 3 times daily    sertraline (ZOLOFT) 50 mg, Oral, Daily       Review of patient's allergies indicates:  No Known Allergies      Review of Systems:  Review of Systems  Negative except for pertinent findings for positives per HPI.     Objective:     Physical Exam   Visit Vitals  /78 (BP Location: Right arm, Patient Position: Sitting)   Pulse 93   Temp 99 °F (37.2 °C) (Oral)   Resp 18   Ht 5' 5" (1.651 m)   Wt 125 kg (275 lb 9.6 oz)   LMP 02/08/2025   SpO2 100%   BMI 45.86 kg/m²       GENERAL: Well-developed female. No acute distress.    SKIN: Normal to inspection, warm and intact.  BREASTS: No rashes or erythema. No masses, lumps, discharge, tenderness.  VULVA: General appearance normal; external genitalia with no " lesions or erythema.  VAGINA: Mucosa/vaginal vault pink, no abnormal discharge or lesions.  CERVIX: Pink, anterior lip visualized, high in vault, no erythema or abnormal discharge.  BIMANUAL EXAM: limited exam d/t body habitus. The uterus is non tender. Bilateral adnexa reveal no tenderness.  PSYCHIATRIC: Patient is oriented to person, place, and time. Mood and affect are normal.    Assessment:       ICD-10-CM ICD-9-CM   1. Well woman exam with routine gynecological exam  Z01.419 V72.31   2. Screening for STD (sexually transmitted disease)  Z11.3 V74.5   3. Establishing care with new doctor, encounter for  Z76.89 V65.8       Plan:     1. Well woman exam with routine gynecological exam    2. Screening for STD (sexually transmitted disease)  -     Chlamydia/GC, PCR  -     Wet Prep, Genital  -     HIV 1/2 Ag/Ab (4th Gen); Future; Expected date: 02/26/2025  -     Hepatitis C Antibody; Future; Expected date: 02/26/2025  -     Hepatitis B Surface Antigen; Future; Expected date: 02/26/2025  -     SYPHILIS ANTIBODY (WITH REFLEX RPR); Future; Expected date: 02/26/2025  -     POCT urine pregnancy    3. Establishing care with new doctor, encounter for  -     Ambulatory referral/consult to Internal Medicine    Pelvic exam today, pap UTD per ACOG recommendations  STD testing  UPT - neg    Request PCP, need new PCP    Smoking cessation counseling provided. Instructed on smoking cessation program through [facility] and pharmacological interventions to aid in cessation.    Call with any GYN concerns    Follow up in about 1 year (around 2/26/2026) for Annual.

## (undated) DEVICE — DRESSING TELFA STRL 4X3 LF

## (undated) DEVICE — NDL FLTR 5MCRN BLNT TIP 18GX1

## (undated) DEVICE — TOWEL OR DISP STRL BLUE 4/PK

## (undated) DEVICE — TRAY SKIN SCRUB WET PREMIUM

## (undated) DEVICE — SEE L#152161

## (undated) DEVICE — PAD PREP CUFFED NS 24X48IN

## (undated) DEVICE — APPLICATOR STRL COT 2INNR 6IN

## (undated) DEVICE — GLOVE PROTEXIS BLUE LATEX 6.5

## (undated) DEVICE — DRAPE LEGGINGS CUFF 31X48IN

## (undated) DEVICE — GOWN POLY REINF BRTH SLV LG

## (undated) DEVICE — TROCAR KII FIOS 5MM X 100MM

## (undated) DEVICE — PAD PINK TRENDELENBURG POS XL

## (undated) DEVICE — NDL SYR 10ML 18X1.5 LL BLUNT

## (undated) DEVICE — ADHESIVE DERMABOND ADVANCED

## (undated) DEVICE — DRAPE UNDERBUTTOCKS PCH STRL

## (undated) DEVICE — GLOVE PROTEXIS LTX MICRO  7

## (undated) DEVICE — SPONGE LAP STRL 18X18IN

## (undated) DEVICE — GOWN POLY REINF BRTH SLV XL

## (undated) DEVICE — TUBING MEDI-VAC 20FT .25IN

## (undated) DEVICE — DRAPE UINDERBUT GRAD PCH

## (undated) DEVICE — UTERINE MANIPULATOR HUMI 6003

## (undated) DEVICE — HANDLE DEVON RIGID OR LIGHT

## (undated) DEVICE — SUT COATED VICRYL 4/0 27IN

## (undated) DEVICE — NDL HYPO REG 25G X 1 1/2

## (undated) DEVICE — SUT MCRYL PLUS 4-0 PS2 27IN

## (undated) DEVICE — TIP SUCTION YANKAUER

## (undated) DEVICE — SYR 50CC LL

## (undated) DEVICE — TRAY CATH FOL SIL URIMTR 16FR

## (undated) DEVICE — GLOVE PROTEXIS HYDROGEL SZ6

## (undated) DEVICE — SOL IRRI STRL WATER 1000ML

## (undated) DEVICE — GLOVE PROTEXIS BLUE LATEX 7.5

## (undated) DEVICE — SOLIDIFIER BOTTLE 1500CC

## (undated) DEVICE — COVER MAYO STAND REINFRCD 30

## (undated) DEVICE — SYR 10CC LUER LOCK

## (undated) DEVICE — GLOVE PROTEXIS BLUE LATEX 7

## (undated) DEVICE — GLOVE PROTEXIS HYDROGEL SZ6.5

## (undated) DEVICE — APPLICATOR CHLORAPREP ORN 26ML

## (undated) DEVICE — ELECTRODE REM POLYHESIVE II

## (undated) DEVICE — KIT LAPAROSCOPY UNIVERSITY

## (undated) DEVICE — SUT CTD VICRYL 0 UND BR CT